# Patient Record
Sex: MALE | Race: WHITE | NOT HISPANIC OR LATINO | Employment: OTHER | URBAN - METROPOLITAN AREA
[De-identification: names, ages, dates, MRNs, and addresses within clinical notes are randomized per-mention and may not be internally consistent; named-entity substitution may affect disease eponyms.]

---

## 2017-01-18 ASSESSMENT — MIFFLIN-ST. JEOR: SCORE: 1744.63

## 2017-01-22 ENCOUNTER — ANESTHESIA - HEALTHEAST (OUTPATIENT)
Dept: SURGERY | Facility: CLINIC | Age: 72
End: 2017-01-22

## 2017-01-23 ENCOUNTER — SURGERY - HEALTHEAST (OUTPATIENT)
Dept: SURGERY | Facility: CLINIC | Age: 72
End: 2017-01-23

## 2017-01-23 ASSESSMENT — MIFFLIN-ST. JEOR: SCORE: 1749.16

## 2018-11-06 ENCOUNTER — RECORDS - HEALTHEAST (OUTPATIENT)
Dept: LAB | Facility: CLINIC | Age: 73
End: 2018-11-06

## 2018-11-06 LAB
CHOLEST SERPL-MCNC: 218 MG/DL
FASTING STATUS PATIENT QL REPORTED: NO
HDLC SERPL-MCNC: 48 MG/DL
LDLC SERPL CALC-MCNC: 141 MG/DL
PSA SERPL-MCNC: 4.3 NG/ML (ref 0–6.5)
TRIGL SERPL-MCNC: 146 MG/DL

## 2021-05-30 VITALS — WEIGHT: 213 LBS | BODY MASS INDEX: 28.85 KG/M2 | HEIGHT: 72 IN

## 2021-06-08 NOTE — ANESTHESIA PREPROCEDURE EVALUATION
Anesthesia Evaluation      Patient summary reviewed   No history of anesthetic complications     Airway   Mallampati: II  Neck ROM: full   Pulmonary - negative ROS and normal exam                          Cardiovascular - negative ROS and normal exam   Neuro/Psych - negative ROS     Endo/Other - negative ROS      GI/Hepatic/Renal - negative ROS           Dental - normal exam                        Anesthesia Plan  Planned anesthetic: MAC  Decadron, Zofran, Toradol.  Ketamine (0.5 mg/kg).    After discussing with Dr. Riley, we decided to proceed with sedation only, no GA.  ASA 1   Induction: intravenous   Anesthetic plan and risks discussed with: patient  Anesthesia plan special considerations: antiemetics,   Post-op plan: routine recovery

## 2021-06-08 NOTE — ANESTHESIA CARE TRANSFER NOTE
Last vitals:   Vitals:    01/23/17 0741   BP: (!) 88/51   Pulse: (!) 52   Resp: 16   Temp: 36.8  C (98.2  F)   SpO2: 98%     Patient's level of consciousness is drowsy  Spontaneous respirations: yes  Maintains airway independently: yes  Dentition unchanged: yes  Oropharynx: oropharynx clear of all foreign objects    QCDR Measures:  ASA# 20 - Surgical Safety Checklist: ASA20A - Safety Checks Done  PQRS# 430 - Adult PONV Prevention: 4558F - Pt received => 2 anti-emetic agents (different classes) preop & intraop  ASA# 8 - Peds PONV Prevention: NA - Not pediatric patient, not GA or 2 or more risk factors NOT present  PQRS# 424 - Megha-op Temp Management: 4559F - At least one body temp DOCUMENTED => 35.5C or 95.9F within required timeframe  PQRS# 426 - PACU Transfer Protocol: - Transfer of care checklist used  ASA# 14 - Acute Post-op Pain: ASA14B - Patient did NOT experience pain >= 7 out of 10    I completed my SBAR handoff to the receiving nurse per policy and procedure.  To Phase 2 rm 9. VSS. Report to BEVERLY Alfaro.

## 2021-06-08 NOTE — ANESTHESIA POSTPROCEDURE EVALUATION
Patient: Dilan Head  EXCISION BACK MASS  Anesthesia type: MAC    Patient location: Phase II Recovery  Last vitals:   Vitals:    01/23/17 0830   BP: 115/70   Pulse: 61   Resp: 16   Temp:    SpO2: 97%     Post vital signs: stable  Level of consciousness: awake and responds to simple questions  Post-anesthesia pain: pain controlled  Post-anesthesia nausea and vomiting: no  Pulmonary: unassisted, return to baseline  Cardiovascular: stable and blood pressure at baseline  Hydration: adequate  Anesthetic events: no    QCDR Measures:  ASA# 11 - Megha-op Cardiac Arrest: ASA11B - Patient did NOT experience unanticipated cardiac arrest  ASA# 12 - Megha-op Mortality Rate: ASA12B - Patient did NOT die  ASA# 13 - PACU Re-Intubation Rate: NA - No ETT / LMA used for case  ASA# 10 - Composite Anes Safety: ASA10A - No serious adverse event  ASA# 38 - New Corneal Injury: ASA38A - No new exposure keratitis or corneal abrasion in PACU    Additional Notes:

## 2021-11-17 ENCOUNTER — LAB REQUISITION (OUTPATIENT)
Dept: LAB | Facility: CLINIC | Age: 76
End: 2021-11-17
Payer: COMMERCIAL

## 2021-11-17 DIAGNOSIS — Z03.818 ENCOUNTER FOR OBSERVATION FOR SUSPECTED EXPOSURE TO OTHER BIOLOGICAL AGENTS RULED OUT: ICD-10-CM

## 2021-11-17 PROCEDURE — U0005 INFEC AGEN DETEC AMPLI PROBE: HCPCS | Mod: ORL | Performed by: PHYSICIAN ASSISTANT

## 2021-11-18 LAB — SARS-COV-2 RNA RESP QL NAA+PROBE: NEGATIVE

## 2023-06-28 ENCOUNTER — LAB REQUISITION (OUTPATIENT)
Dept: LAB | Facility: CLINIC | Age: 78
End: 2023-06-28

## 2023-06-28 DIAGNOSIS — E78.5 HYPERLIPIDEMIA, UNSPECIFIED: ICD-10-CM

## 2023-06-28 LAB
ALBUMIN SERPL BCG-MCNC: 4.1 G/DL (ref 3.5–5.2)
ALP SERPL-CCNC: 76 U/L (ref 40–129)
ALT SERPL W P-5'-P-CCNC: 27 U/L (ref 0–70)
ANION GAP SERPL CALCULATED.3IONS-SCNC: 11 MMOL/L (ref 7–15)
AST SERPL W P-5'-P-CCNC: 26 U/L (ref 0–45)
BILIRUB SERPL-MCNC: 0.5 MG/DL
BUN SERPL-MCNC: 17.4 MG/DL (ref 8–23)
CALCIUM SERPL-MCNC: 9.2 MG/DL (ref 8.8–10.2)
CHLORIDE SERPL-SCNC: 103 MMOL/L (ref 98–107)
CHOLEST SERPL-MCNC: 200 MG/DL
CREAT SERPL-MCNC: 1.01 MG/DL (ref 0.67–1.17)
DEPRECATED HCO3 PLAS-SCNC: 24 MMOL/L (ref 22–29)
GFR SERPL CREATININE-BSD FRML MDRD: 78 ML/MIN/1.73M2
GLUCOSE SERPL-MCNC: 84 MG/DL (ref 70–99)
HDLC SERPL-MCNC: 52 MG/DL
LDLC SERPL CALC-MCNC: 133 MG/DL
NONHDLC SERPL-MCNC: 148 MG/DL
POTASSIUM SERPL-SCNC: 3.8 MMOL/L (ref 3.4–5.3)
PROT SERPL-MCNC: 6.6 G/DL (ref 6.4–8.3)
SODIUM SERPL-SCNC: 138 MMOL/L (ref 136–145)
TRIGL SERPL-MCNC: 76 MG/DL

## 2023-06-28 PROCEDURE — 80061 LIPID PANEL: CPT | Performed by: PHYSICIAN ASSISTANT

## 2023-06-28 PROCEDURE — 80053 COMPREHEN METABOLIC PANEL: CPT | Performed by: PHYSICIAN ASSISTANT

## 2024-04-17 ENCOUNTER — APPOINTMENT (OUTPATIENT)
Dept: CT IMAGING | Facility: CLINIC | Age: 79
End: 2024-04-17
Attending: INTERNAL MEDICINE
Payer: COMMERCIAL

## 2024-04-17 ENCOUNTER — HOSPITAL ENCOUNTER (OUTPATIENT)
Facility: CLINIC | Age: 79
Setting detail: OBSERVATION
Discharge: HOME | End: 2024-04-18
Attending: STUDENT IN AN ORGANIZED HEALTH CARE EDUCATION/TRAINING PROGRAM | Admitting: STUDENT IN AN ORGANIZED HEALTH CARE EDUCATION/TRAINING PROGRAM
Payer: COMMERCIAL

## 2024-04-17 ENCOUNTER — APPOINTMENT (OUTPATIENT)
Dept: CARDIOLOGY | Facility: CLINIC | Age: 79
End: 2024-04-17
Attending: INTERNAL MEDICINE
Payer: COMMERCIAL

## 2024-04-17 ENCOUNTER — APPOINTMENT (OUTPATIENT)
Dept: CT IMAGING | Facility: CLINIC | Age: 79
End: 2024-04-17
Attending: STUDENT IN AN ORGANIZED HEALTH CARE EDUCATION/TRAINING PROGRAM
Payer: COMMERCIAL

## 2024-04-17 DIAGNOSIS — F51.01 PRIMARY INSOMNIA: ICD-10-CM

## 2024-04-17 DIAGNOSIS — R52 GENERALIZED PAIN: Primary | ICD-10-CM

## 2024-04-17 DIAGNOSIS — R07.9 CHEST PAIN, UNSPECIFIED TYPE: ICD-10-CM

## 2024-04-17 LAB
ALBUMIN SERPL BCG-MCNC: 3.8 G/DL (ref 3.5–5.2)
ALP SERPL-CCNC: 74 U/L (ref 40–150)
ALT SERPL W P-5'-P-CCNC: 17 U/L (ref 0–70)
ANION GAP SERPL CALCULATED.3IONS-SCNC: 10 MMOL/L (ref 7–15)
ANION GAP SERPL CALCULATED.3IONS-SCNC: 8 MMOL/L (ref 7–15)
AST SERPL W P-5'-P-CCNC: 25 U/L (ref 0–45)
ATRIAL RATE - MUSE: 69 BPM
BASOPHILS # BLD AUTO: 0 10E3/UL (ref 0–0.2)
BASOPHILS # BLD AUTO: 0.1 10E3/UL (ref 0–0.2)
BASOPHILS NFR BLD AUTO: 0 %
BASOPHILS NFR BLD AUTO: 1 %
BILIRUB SERPL-MCNC: 0.6 MG/DL
BSA FOR ECHO PROCEDURE: 2.34 M2
BUN SERPL-MCNC: 23.9 MG/DL (ref 8–23)
BUN SERPL-MCNC: 27.6 MG/DL (ref 8–23)
CALCIUM SERPL-MCNC: 9 MG/DL (ref 8.8–10.2)
CALCIUM SERPL-MCNC: 9.2 MG/DL (ref 8.8–10.2)
CCTA ASCENDING AORTA: 3.5
CCTA SINUS: 4
CHLORIDE SERPL-SCNC: 106 MMOL/L (ref 98–107)
CHLORIDE SERPL-SCNC: 109 MMOL/L (ref 98–107)
CREAT SERPL-MCNC: 0.99 MG/DL (ref 0.67–1.17)
CREAT SERPL-MCNC: 1.26 MG/DL (ref 0.67–1.17)
CRP SERPL-MCNC: 3.23 MG/L
CV CALCIUM SCORE AGATSTON LM: 0
CV CALCIUM SCORING AGATSON LAD: 0
CV CALCIUM SCORING AGATSTON CX: 0
CV CALCIUM SCORING AGATSTON RCA: 150
CV CALCIUM SCORING AGATSTON TOTAL: 150
DEPRECATED HCO3 PLAS-SCNC: 23 MMOL/L (ref 22–29)
DEPRECATED HCO3 PLAS-SCNC: 27 MMOL/L (ref 22–29)
DIASTOLIC BLOOD PRESSURE - MUSE: NORMAL MMHG
EGFRCR SERPLBLD CKD-EPI 2021: 59 ML/MIN/1.73M2
EGFRCR SERPLBLD CKD-EPI 2021: 79 ML/MIN/1.73M2
EOSINOPHIL # BLD AUTO: 0.1 10E3/UL (ref 0–0.7)
EOSINOPHIL # BLD AUTO: 0.3 10E3/UL (ref 0–0.7)
EOSINOPHIL NFR BLD AUTO: 1 %
EOSINOPHIL NFR BLD AUTO: 4 %
ERYTHROCYTE [DISTWIDTH] IN BLOOD BY AUTOMATED COUNT: 12.7 % (ref 10–15)
ERYTHROCYTE [DISTWIDTH] IN BLOOD BY AUTOMATED COUNT: 12.8 % (ref 10–15)
ERYTHROCYTE [SEDIMENTATION RATE] IN BLOOD BY WESTERGREN METHOD: 7 MM/HR (ref 0–20)
FLUAV RNA SPEC QL NAA+PROBE: NEGATIVE
FLUBV RNA RESP QL NAA+PROBE: NEGATIVE
GLUCOSE SERPL-MCNC: 94 MG/DL (ref 70–99)
GLUCOSE SERPL-MCNC: 98 MG/DL (ref 70–99)
HCT VFR BLD AUTO: 44.2 % (ref 40–53)
HCT VFR BLD AUTO: 48.3 % (ref 40–53)
HGB BLD-MCNC: 15.1 G/DL (ref 13.3–17.7)
HGB BLD-MCNC: 16.2 G/DL (ref 13.3–17.7)
HOLD SPECIMEN: NORMAL
IMM GRANULOCYTES # BLD: 0 10E3/UL
IMM GRANULOCYTES # BLD: 0.1 10E3/UL
IMM GRANULOCYTES NFR BLD: 0 %
IMM GRANULOCYTES NFR BLD: 0 %
INTERPRETATION ECG - MUSE: NORMAL
LYMPHOCYTES # BLD AUTO: 0.7 10E3/UL (ref 0.8–5.3)
LYMPHOCYTES # BLD AUTO: 1.6 10E3/UL (ref 0.8–5.3)
LYMPHOCYTES NFR BLD AUTO: 21 %
LYMPHOCYTES NFR BLD AUTO: 6 %
MAGNESIUM SERPL-MCNC: 2.2 MG/DL (ref 1.7–2.3)
MCH RBC QN AUTO: 29.6 PG (ref 26.5–33)
MCH RBC QN AUTO: 30 PG (ref 26.5–33)
MCHC RBC AUTO-ENTMCNC: 33.5 G/DL (ref 31.5–36.5)
MCHC RBC AUTO-ENTMCNC: 34.2 G/DL (ref 31.5–36.5)
MCV RBC AUTO: 88 FL (ref 78–100)
MCV RBC AUTO: 88 FL (ref 78–100)
MONOCYTES # BLD AUTO: 0.6 10E3/UL (ref 0–1.3)
MONOCYTES # BLD AUTO: 0.8 10E3/UL (ref 0–1.3)
MONOCYTES NFR BLD AUTO: 6 %
MONOCYTES NFR BLD AUTO: 8 %
NEUTROPHILS # BLD AUTO: 10.7 10E3/UL (ref 1.6–8.3)
NEUTROPHILS # BLD AUTO: 5 10E3/UL (ref 1.6–8.3)
NEUTROPHILS NFR BLD AUTO: 65 %
NEUTROPHILS NFR BLD AUTO: 87 %
NRBC # BLD AUTO: 0 10E3/UL
NRBC # BLD AUTO: 0 10E3/UL
NRBC BLD AUTO-RTO: 0 /100
NRBC BLD AUTO-RTO: 0 /100
NT-PROBNP SERPL-MCNC: 45 PG/ML (ref 0–1800)
P AXIS - MUSE: 55 DEGREES
PLATELET # BLD AUTO: 199 10E3/UL (ref 150–450)
PLATELET # BLD AUTO: 240 10E3/UL (ref 150–450)
POTASSIUM SERPL-SCNC: 3.9 MMOL/L (ref 3.4–5.3)
POTASSIUM SERPL-SCNC: 4.1 MMOL/L (ref 3.4–5.3)
PR INTERVAL - MUSE: 188 MS
PROCALCITONIN SERPL IA-MCNC: 0.05 NG/ML
PROT SERPL-MCNC: 6.1 G/DL (ref 6.4–8.3)
QRS DURATION - MUSE: 98 MS
QT - MUSE: 416 MS
QTC - MUSE: 445 MS
R AXIS - MUSE: -61 DEGREES
RBC # BLD AUTO: 5.04 10E6/UL (ref 4.4–5.9)
RBC # BLD AUTO: 5.47 10E6/UL (ref 4.4–5.9)
RSV RNA SPEC NAA+PROBE: NEGATIVE
SARS-COV-2 RNA RESP QL NAA+PROBE: NEGATIVE
SODIUM SERPL-SCNC: 140 MMOL/L (ref 135–145)
SODIUM SERPL-SCNC: 143 MMOL/L (ref 135–145)
SYSTOLIC BLOOD PRESSURE - MUSE: NORMAL MMHG
T AXIS - MUSE: 37 DEGREES
TROPONIN T SERPL HS-MCNC: 6 NG/L
TROPONIN T SERPL HS-MCNC: 7 NG/L
TROPONIN T SERPL HS-MCNC: 7 NG/L
VENTRICULAR RATE- MUSE: 69 BPM
WBC # BLD AUTO: 12.4 10E3/UL (ref 4–11)
WBC # BLD AUTO: 7.7 10E3/UL (ref 4–11)

## 2024-04-17 PROCEDURE — 74177 CT ABD & PELVIS W/CONTRAST: CPT

## 2024-04-17 PROCEDURE — 99207 PR APP CREDIT; MD BILLING SHARED VISIT: CPT | Performed by: FAMILY MEDICINE

## 2024-04-17 PROCEDURE — 250N000013 HC RX MED GY IP 250 OP 250 PS 637: Performed by: INTERNAL MEDICINE

## 2024-04-17 PROCEDURE — 99223 1ST HOSP IP/OBS HIGH 75: CPT | Performed by: INTERNAL MEDICINE

## 2024-04-17 PROCEDURE — 250N000011 HC RX IP 250 OP 636: Performed by: FAMILY MEDICINE

## 2024-04-17 PROCEDURE — G0378 HOSPITAL OBSERVATION PER HR: HCPCS

## 2024-04-17 PROCEDURE — 96374 THER/PROPH/DIAG INJ IV PUSH: CPT

## 2024-04-17 PROCEDURE — 36415 COLL VENOUS BLD VENIPUNCTURE: CPT | Performed by: INTERNAL MEDICINE

## 2024-04-17 PROCEDURE — 96375 TX/PRO/DX INJ NEW DRUG ADDON: CPT | Mod: 59

## 2024-04-17 PROCEDURE — 250N000013 HC RX MED GY IP 250 OP 250 PS 637: Performed by: FAMILY MEDICINE

## 2024-04-17 PROCEDURE — 85025 COMPLETE CBC W/AUTO DIFF WBC: CPT | Performed by: STUDENT IN AN ORGANIZED HEALTH CARE EDUCATION/TRAINING PROGRAM

## 2024-04-17 PROCEDURE — 250N000009 HC RX 250: Performed by: INTERNAL MEDICINE

## 2024-04-17 PROCEDURE — 85652 RBC SED RATE AUTOMATED: CPT | Performed by: INTERNAL MEDICINE

## 2024-04-17 PROCEDURE — 75574 CT ANGIO HRT W/3D IMAGE: CPT | Mod: 26 | Performed by: GENERAL ACUTE CARE HOSPITAL

## 2024-04-17 PROCEDURE — 93306 TTE W/DOPPLER COMPLETE: CPT | Mod: 26 | Performed by: INTERNAL MEDICINE

## 2024-04-17 PROCEDURE — 86140 C-REACTIVE PROTEIN: CPT | Performed by: INTERNAL MEDICINE

## 2024-04-17 PROCEDURE — 82040 ASSAY OF SERUM ALBUMIN: CPT | Performed by: INTERNAL MEDICINE

## 2024-04-17 PROCEDURE — 36415 COLL VENOUS BLD VENIPUNCTURE: CPT | Performed by: EMERGENCY MEDICINE

## 2024-04-17 PROCEDURE — 85025 COMPLETE CBC W/AUTO DIFF WBC: CPT | Mod: 91 | Performed by: INTERNAL MEDICINE

## 2024-04-17 PROCEDURE — 84484 ASSAY OF TROPONIN QUANT: CPT | Performed by: INTERNAL MEDICINE

## 2024-04-17 PROCEDURE — 84145 PROCALCITONIN (PCT): CPT | Performed by: INTERNAL MEDICINE

## 2024-04-17 PROCEDURE — 71275 CT ANGIOGRAPHY CHEST: CPT

## 2024-04-17 PROCEDURE — 93005 ELECTROCARDIOGRAM TRACING: CPT | Mod: XU | Performed by: EMERGENCY MEDICINE

## 2024-04-17 PROCEDURE — 84450 TRANSFERASE (AST) (SGOT): CPT | Performed by: INTERNAL MEDICINE

## 2024-04-17 PROCEDURE — 96361 HYDRATE IV INFUSION ADD-ON: CPT

## 2024-04-17 PROCEDURE — 84484 ASSAY OF TROPONIN QUANT: CPT | Mod: 91 | Performed by: STUDENT IN AN ORGANIZED HEALTH CARE EDUCATION/TRAINING PROGRAM

## 2024-04-17 PROCEDURE — 250N000011 HC RX IP 250 OP 636: Performed by: STUDENT IN AN ORGANIZED HEALTH CARE EDUCATION/TRAINING PROGRAM

## 2024-04-17 PROCEDURE — 83735 ASSAY OF MAGNESIUM: CPT | Performed by: INTERNAL MEDICINE

## 2024-04-17 PROCEDURE — 250N000013 HC RX MED GY IP 250 OP 250 PS 637: Performed by: STUDENT IN AN ORGANIZED HEALTH CARE EDUCATION/TRAINING PROGRAM

## 2024-04-17 PROCEDURE — 87637 SARSCOV2&INF A&B&RSV AMP PRB: CPT | Performed by: INTERNAL MEDICINE

## 2024-04-17 PROCEDURE — 36415 COLL VENOUS BLD VENIPUNCTURE: CPT | Performed by: STUDENT IN AN ORGANIZED HEALTH CARE EDUCATION/TRAINING PROGRAM

## 2024-04-17 PROCEDURE — 80048 BASIC METABOLIC PNL TOTAL CA: CPT | Performed by: STUDENT IN AN ORGANIZED HEALTH CARE EDUCATION/TRAINING PROGRAM

## 2024-04-17 PROCEDURE — 75574 CT ANGIO HRT W/3D IMAGE: CPT

## 2024-04-17 PROCEDURE — 255N000002 HC RX 255 OP 636: Performed by: FAMILY MEDICINE

## 2024-04-17 PROCEDURE — 258N000003 HC RX IP 258 OP 636: Performed by: INTERNAL MEDICINE

## 2024-04-17 PROCEDURE — 83880 ASSAY OF NATRIURETIC PEPTIDE: CPT | Performed by: INTERNAL MEDICINE

## 2024-04-17 PROCEDURE — 99204 OFFICE O/P NEW MOD 45 MIN: CPT | Performed by: INTERNAL MEDICINE

## 2024-04-17 PROCEDURE — 99285 EMERGENCY DEPT VISIT HI MDM: CPT | Mod: 25

## 2024-04-17 PROCEDURE — C9113 INJ PANTOPRAZOLE SODIUM, VIA: HCPCS | Performed by: FAMILY MEDICINE

## 2024-04-17 RX ORDER — AMOXICILLIN 250 MG
2 CAPSULE ORAL 2 TIMES DAILY PRN
Status: DISCONTINUED | OUTPATIENT
Start: 2024-04-17 | End: 2024-04-18 | Stop reason: HOSPADM

## 2024-04-17 RX ORDER — BENZOCAINE/MENTHOL 6 MG-10 MG
1 LOZENGE MUCOUS MEMBRANE PRN
COMMUNITY
Start: 2023-06-28

## 2024-04-17 RX ORDER — CALCIUM CARBONATE 500 MG/1
1000 TABLET, CHEWABLE ORAL 4 TIMES DAILY PRN
Status: DISCONTINUED | OUTPATIENT
Start: 2024-04-17 | End: 2024-04-18 | Stop reason: HOSPADM

## 2024-04-17 RX ORDER — LIDOCAINE 40 MG/G
CREAM TOPICAL
Status: DISCONTINUED | OUTPATIENT
Start: 2024-04-17 | End: 2024-04-18 | Stop reason: HOSPADM

## 2024-04-17 RX ORDER — MAGNESIUM HYDROXIDE/ALUMINUM HYDROXICE/SIMETHICONE 120; 1200; 1200 MG/30ML; MG/30ML; MG/30ML
15 SUSPENSION ORAL ONCE
Status: COMPLETED | OUTPATIENT
Start: 2024-04-17 | End: 2024-04-17

## 2024-04-17 RX ORDER — ALBUTEROL SULFATE 90 UG/1
1-2 AEROSOL, METERED RESPIRATORY (INHALATION) 4 TIMES DAILY PRN
COMMUNITY
Start: 2022-09-01

## 2024-04-17 RX ORDER — IOPAMIDOL 755 MG/ML
100 INJECTION, SOLUTION INTRAVASCULAR ONCE
Status: COMPLETED | OUTPATIENT
Start: 2024-04-17 | End: 2024-04-17

## 2024-04-17 RX ORDER — OXYCODONE HYDROCHLORIDE 5 MG/1
10 TABLET ORAL EVERY 4 HOURS PRN
Status: DISCONTINUED | OUTPATIENT
Start: 2024-04-17 | End: 2024-04-18 | Stop reason: HOSPADM

## 2024-04-17 RX ORDER — NITROGLYCERIN 0.4 MG/1
0.4 TABLET SUBLINGUAL ONCE
Status: COMPLETED | OUTPATIENT
Start: 2024-04-17 | End: 2024-04-17

## 2024-04-17 RX ORDER — OXYCODONE HYDROCHLORIDE 5 MG/1
5 TABLET ORAL EVERY 4 HOURS PRN
Status: DISCONTINUED | OUTPATIENT
Start: 2024-04-17 | End: 2024-04-18 | Stop reason: HOSPADM

## 2024-04-17 RX ORDER — ASPIRIN 325 MG
325 TABLET ORAL ONCE
Status: COMPLETED | OUTPATIENT
Start: 2024-04-17 | End: 2024-04-17

## 2024-04-17 RX ORDER — IOPAMIDOL 755 MG/ML
90 INJECTION, SOLUTION INTRAVASCULAR ONCE
Status: COMPLETED | OUTPATIENT
Start: 2024-04-17 | End: 2024-04-17

## 2024-04-17 RX ORDER — SODIUM CHLORIDE 9 MG/ML
INJECTION, SOLUTION INTRAVENOUS CONTINUOUS
Status: DISCONTINUED | OUTPATIENT
Start: 2024-04-17 | End: 2024-04-17

## 2024-04-17 RX ORDER — METOPROLOL TARTRATE 1 MG/ML
5 INJECTION, SOLUTION INTRAVENOUS
Status: DISCONTINUED | OUTPATIENT
Start: 2024-04-17 | End: 2024-04-18 | Stop reason: HOSPADM

## 2024-04-17 RX ORDER — ACETAMINOPHEN 325 MG/1
650 TABLET ORAL EVERY 4 HOURS PRN
Status: DISCONTINUED | OUTPATIENT
Start: 2024-04-17 | End: 2024-04-18 | Stop reason: HOSPADM

## 2024-04-17 RX ORDER — AMOXICILLIN 250 MG
1 CAPSULE ORAL 2 TIMES DAILY PRN
Status: DISCONTINUED | OUTPATIENT
Start: 2024-04-17 | End: 2024-04-18 | Stop reason: HOSPADM

## 2024-04-17 RX ORDER — ACETAMINOPHEN 650 MG/1
650 SUPPOSITORY RECTAL EVERY 4 HOURS PRN
Status: DISCONTINUED | OUTPATIENT
Start: 2024-04-17 | End: 2024-04-18 | Stop reason: HOSPADM

## 2024-04-17 RX ORDER — NITROGLYCERIN 0.4 MG/1
0.4 TABLET SUBLINGUAL EVERY 5 MIN PRN
Status: DISCONTINUED | OUTPATIENT
Start: 2024-04-17 | End: 2024-04-18

## 2024-04-17 RX ADMIN — IOPAMIDOL 100 ML: 755 INJECTION, SOLUTION INTRAVENOUS at 09:00

## 2024-04-17 RX ADMIN — NITROGLYCERIN 0.4 MG: 0.4 TABLET SUBLINGUAL at 09:38

## 2024-04-17 RX ADMIN — ASPIRIN 325 MG ORAL TABLET 325 MG: 325 PILL ORAL at 02:46

## 2024-04-17 RX ADMIN — ACETAMINOPHEN 650 MG: 325 TABLET ORAL at 15:48

## 2024-04-17 RX ADMIN — ALUMINUM HYDROXIDE, MAGNESIUM HYDROXIDE, AND DIMETHICONE 15 ML: 200; 20; 200 SUSPENSION ORAL at 05:00

## 2024-04-17 RX ADMIN — IOPAMIDOL 90 ML: 755 INJECTION, SOLUTION INTRAVENOUS at 03:28

## 2024-04-17 RX ADMIN — ACETAMINOPHEN 650 MG: 325 TABLET ORAL at 21:45

## 2024-04-17 RX ADMIN — FAMOTIDINE 20 MG: 10 INJECTION, SOLUTION INTRAVENOUS at 05:00

## 2024-04-17 RX ADMIN — NITROGLYCERIN 0.4 MG: 0.4 TABLET SUBLINGUAL at 05:44

## 2024-04-17 RX ADMIN — SODIUM CHLORIDE: 9 INJECTION, SOLUTION INTRAVENOUS at 06:38

## 2024-04-17 RX ADMIN — PANTOPRAZOLE SODIUM 40 MG: 40 INJECTION, POWDER, FOR SOLUTION INTRAVENOUS at 17:33

## 2024-04-17 RX ADMIN — OXYCODONE HYDROCHLORIDE 5 MG: 5 TABLET ORAL at 20:19

## 2024-04-17 RX ADMIN — PERFLUTREN 1.5 ML: 6.52 INJECTION, SUSPENSION INTRAVENOUS at 11:20

## 2024-04-17 RX ADMIN — NITROGLYCERIN 0.4 MG: 0.4 TABLET SUBLINGUAL at 05:49

## 2024-04-17 RX ADMIN — METOPROLOL TARTRATE 5 MG: 1 INJECTION, SOLUTION INTRAVENOUS at 09:41

## 2024-04-17 ASSESSMENT — COLUMBIA-SUICIDE SEVERITY RATING SCALE - C-SSRS
2. HAVE YOU ACTUALLY HAD ANY THOUGHTS OF KILLING YOURSELF IN THE PAST MONTH?: NO
6. HAVE YOU EVER DONE ANYTHING, STARTED TO DO ANYTHING, OR PREPARED TO DO ANYTHING TO END YOUR LIFE?: NO
1. IN THE PAST MONTH, HAVE YOU WISHED YOU WERE DEAD OR WISHED YOU COULD GO TO SLEEP AND NOT WAKE UP?: NO

## 2024-04-17 ASSESSMENT — ACTIVITIES OF DAILY LIVING (ADL)
ADLS_ACUITY_SCORE: 24
ADLS_ACUITY_SCORE: 35
ADLS_ACUITY_SCORE: 35
ADLS_ACUITY_SCORE: 24
ADLS_ACUITY_SCORE: 24
ADLS_ACUITY_SCORE: 35
ADLS_ACUITY_SCORE: 24
ADLS_ACUITY_SCORE: 35
ADLS_ACUITY_SCORE: 35
DEPENDENT_IADLS:: INDEPENDENT
ADLS_ACUITY_SCORE: 35
ADLS_ACUITY_SCORE: 24
ADLS_ACUITY_SCORE: 35
ADLS_ACUITY_SCORE: 24
ADLS_ACUITY_SCORE: 35
ADLS_ACUITY_SCORE: 35
ADLS_ACUITY_SCORE: 24
ADLS_ACUITY_SCORE: 35
ADLS_ACUITY_SCORE: 35
ADLS_ACUITY_SCORE: 24

## 2024-04-17 NOTE — ED TRIAGE NOTES
Pt presents to ED with c/o chest pain and shortness of breath starting around 0000.  Denies chest pain at present.  Pt reports still having shortness of breath, feeling like he can't take a deep breath in.  Notes a cough that has been present x3 months.  Pt went to roll over in bed this morning when sx started.     Triage Assessment (Adult)       Row Name 04/17/24 0221          Triage Assessment    Airway WDL WDL        Respiratory WDL    Respiratory WDL X;rhythm/pattern;cough     Rhythm/Pattern, Respiratory shortness of breath     Cough Frequency infrequent  reports cough for the past few months, no changes        Skin Circulation/Temperature WDL    Skin Circulation/Temperature WDL WDL        Cardiac WDL    Cardiac WDL X;chest pain     Cardiac Rhythm NSR        Chest Pain Assessment    Chest Pain Location anterior chest, left     Chest Pain Radiation arm  left     Duration onset at 0000, denies pain now, pain 3/10 when present     Precipitating Factors at rest     Associated Signs/Symptoms dyspnea     Chest Pain Intervention cardiac biomarkers drawn;12-lead ECG obtained;cardiac monitoring continued        Peripheral/Neurovascular WDL    Peripheral Neurovascular WDL WDL        Cognitive/Neuro/Behavioral WDL    Cognitive/Neuro/Behavioral WDL WDL

## 2024-04-17 NOTE — PLAN OF CARE
Pt not complaining of chest pain or SOB during noon assessment. Pt had echo, CT done. MD put in order for biopsy, time still unknown. NS at 100ml/hr. Still NPO.   Ofelia Montanez RN on 4/17/2024 at 12:52 PM   Problem: Adult Inpatient Plan of Care  Goal: Absence of Hospital-Acquired Illness or Injury  Intervention: Identify and Manage Fall Risk  Recent Flowsheet Documentation  Taken 4/17/2024 0800 by Ofelia Montanez, RN  Safety Promotion/Fall Prevention:   activity supervised   safety round/check completed   increase visualization of patient   increased rounding and observation   supervised activity     Problem: Skin Injury Risk Increased  Goal: Skin Health and Integrity  Intervention: Plan: Nurse Driven Intervention: Moisture Management  Recent Flowsheet Documentation  Taken 4/17/2024 0800 by Ofelia Montanez, RN  Moisture Interventions:   Encourage regular toileting   No brief in bed     Problem: Chest Pain  Goal: Resolution of Chest Pain Symptoms  4/17/2024 1250 by Ofelia Montanez, RN  Outcome: Progressing  4/17/2024 0914 by Ofelia Montanez, RN  Outcome: Progressing   Goal Outcome Evaluation:

## 2024-04-17 NOTE — PLAN OF CARE
Plan for pt to have biopsy tomorrow. MD made pt regular diet then at midnight NPO for procedure time still unknown.   Ofelia Montanez RN on 4/17/2024 at 2:10 PM

## 2024-04-17 NOTE — CONSULTS
Care Management Initial Consult    General Information  Assessment completed with: Patient, Spouse or significant other, Patient and wife Juanita at bedside  Type of CM/SW Visit: Initial Assessment    Primary Care Provider verified and updated as needed: Yes   Readmission within the last 30 days: no previous admission in last 30 days      Reason for Consult: discharge planning  Advance Care Planning: Advance Care Planning Reviewed: no concerns identified, questions answered, other (see comments) (Given Honoring Choices)          Communication Assessment  Patient's communication style: spoken language (English or Bilingual)    Hearing Difficulty or Deaf: yes   Wear Glasses or Blind: yes    Cognitive  Cognitive/Neuro/Behavioral: WDL                      Living Environment:   People in home: spouse     Current living Arrangements: house      Able to return to prior arrangements: yes  Living Arrangement Comments: Lives with wife    Family/Social Support:  Care provided by: self  Provides care for: no one  Marital Status:   Wife  Juanita       Description of Support System: Supportive, Involved    Support Assessment: Adequate family and caregiver support    Current Resources:   Patient receiving home care services: No     Community Resources: None  Equipment currently used at home: none  Supplies currently used at home: None    Employment/Financial:  Employment Status: retired        Financial Concerns: none   Referral to Financial Worker: No     oes the patient's insurance plan have a 3 day qualifying hospital stay waiver?  No    Lifestyle & Psychosocial Needs:  Social Determinants of Health     Food Insecurity: Not on file   Depression: Not on file   Housing Stability: Not on file   Tobacco Use: Medium Risk (7/1/2021)    Patient History     Smoking Tobacco Use: Former     Smokeless Tobacco Use: Unknown     Passive Exposure: Not on file   Financial Resource Strain: Not on file   Alcohol Use: Not on file  "  Transportation Needs: Not on file   Physical Activity: Not on file   Interpersonal Safety: Not on file   Stress: Not on file   Social Connections: Not on file   Health Literacy: Not on file       Functional Status:  Prior to admission patient needed assistance:   Dependent ADLs:: Independent  Dependent IADLs:: Independent  Assesssment of Functional Status: At functional baseline    Mental Health Status:  Mental Health Status: No Current Concerns       Chemical Dependency Status:              Values/Beliefs:  Spiritual, Cultural Beliefs, Baptism Practices, Values that affect care:                 Additional Information:   78-year-old male who is previously healthy and presents the emergency department with sudden onset left-sided chest pain \"pressure\" that radiates to his left arm with associated shortness of breath.  He is not in acute distress, has normal vital signs with the exception of hypertension.  He recently traveled by flight from [place].  No leg swelling .    Met with patient and wife Juanita to introduce CM services. Reports he lives with wife in a house. States independence with I/ADLs. No assistive ambulatory device; no home care services; does drive. Explained STUART/Observation Status to patient and wife. Answered questions to their satisfaction and provided them with Honoring Choices forms. Plan is for patient to return home with wife transporting patient on discharge.    BISHOP MORELOS RN/CM      "

## 2024-04-17 NOTE — PROGRESS NOTES
[facility]    Progress Note - Hospitalist Service       Date of Admission:  4/17/2024    Assessment & Plan   Dilan Head is a 78 year old male admitted on 4/17/2024. He has no significant PMH and is admitted for chest pain ACS rule out.     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 Adenopathy and/or thymoma not excluded. CT ap with mildly inflamed small elongated right inguinal node, likely accounting for palpable abnormality, tender to palpation on exam. No suspicious abdominopelvic adenopathy. Small fat-containing right inguinal hernia.     - Oncology referral placed on admission, discussed recommendation for mediastinal biopsy with IR   - IR consult placed, appreciate assistance     Left sided chest pain, very pleuritic and positional in nature  ACS ruled out  CT with no PE but has Moderate coronary artery calcifications. Borderline aneurysmal ascending thoracic aorta. Troponin negative x 2. BNP neg, normal electrolytes. EKG NSR no ischemic changes. Heart score: low risk.   Recent URI, pain is pleuritic and positional, with small effusion, consider pericarditis.   Last lipid profile 9 months ago: total cholesterol 200, . No A1c on chart review. BG well controlled during admission.   - Cardiology consulted on admission, appreciate cares and recs   - Echocardiogram and CT coronary artery angiogram ordered  - Telemetry    - ESR, CRP     Elevated creatinine, improved  BPH as seen on CT - Denies BPH symptoms   Cr improved with fluids.   - Encourage PO  - advised to follow up outpatient regarding BPH     Cough  CT chest: Scattered emphysematous changes. Remote smoking history   COVID test negative, BNP negative, vitally stable. Consider component of URI vs postviral cough given his URI symptoms about one month ago that have otherwise resolved, as well as emphysema, seems  less likely cardiac related given negative workup  - Monitor  - Lozenges PRN        Diet: NPO for Medical/Clinical Reasons Except for: Meds, Ice Chips    DVT Prophylaxis: Pneumatic Compression Devices  Turner Catheter: Not present  Fluids: mIVF while NPO  Lines: None     Cardiac Monitoring: ACTIVE order. Indication: Chest pain/ ACS rule out (24 hours)  Code Status: Full Code      Clinically Significant Risk Factors Present on Admission                       # Obesity: Estimated body mass index is 33.91 kg/m  as calculated from the following:    Height as of this encounter: 1.829 m (6').    Weight as of this encounter: 113.4 kg (250 lb).              Disposition Plan      Expected Discharge Date: 04/18/2024                The patient's care was discussed with the Attending Physician, Dr. Head .    Craol Gabriel DO PGY2  Hospitalist Service  [facility]  Securely message with [employer] (more info)  Text page via [facility] Paging/Directory     Patient independently seen and examined  Discussed with resident  Agree with documentation, assessment and plan as outlined above and below     Ignacio Head MD  [facility] Medicine Service  ______________________________________________________________________    Interval History   Nursing notes and vitals reviewed. No changes to symptoms after nitroglycerin x2. Vitally stable, BP this morning softer than on admissio, 112/61.     Patient reports ongoing chest pressure, only change from admission is that it does not radiate to his left shoulder anymore, which he believes was relieved with the nitroglycerin he received in the ED. Chest pressure is substernal, pleuritic, improved with sitting up or standing. Feels a bit short of breath all the time, no changes to symptoms with activity. Denies PND, orthopnea, dyspnea on exertion.     Beginning of March had viral URI type symptoms (congestion, cough), which resolved on their own. Developed  cardiac symptoms several weeks after his symptoms resolved.     He does snore, had a sleep study and was diagnosed with sleep apnea. Has a CPAP, but could not tolerate so does not wear it.     Denies family or personal history of cardiac events. (Mother in law has history of MI).     Former smoker.     Physical Exam   Vital Signs: Temp: 98.2  F (36.8  C) Temp src: Oral BP: 112/61 Pulse: 70   Resp: 26 SpO2: 95 % O2 Device: None (Room air)    Weight: 250 lbs 0 oz    Constitutional: awake, alert, cooperative, no apparent distress, and appears stated age  Eyes: Lids and lashes normal, pupils equal, round and reactive to light, extra ocular muscles intact, sclera clear, conjunctiva normal  ENT: normocephalic, without obvious abnormality, atraumatic, moist mucous membranes, external ears normal  Hematologic / Lymphatic: Tender right inguinal lymph node  Respiratory: No increased work of breathing, good air exchange, clear to auscultation bilaterally, no crackles or wheezing  Cardiovascular:  regular rate and rhythm, normal S1 and S2, no S3 or S4, and no murmur noted  GI: normal bowel sounds, soft, non-distended, non-tender  Skin: no bruising or bleeding, normal skin color, texture, turgor, and no redness, warmth, or swelling  Musculoskeletal: There is no redness, warmth, or swelling of the joints.  Full range of motion noted.  Motor strength is 5 out of 5 all extremities bilaterally.  Tone is normal.  Neuropsychiatric: General: normal, calm, and normal eye contact  Level of consciousness: alert / normal  Affect: normal  Orientation: oriented to self, place, time and situation    Medical Decision Making     Please see A&P for additional details of medical decision making.      Data     I have personally reviewed the following data over the past 24 hrs:    12.4 (H)  \   15.1   / 199     140 109 (H) 23.9 (H) /  98   3.9 23 0.99 \     ALT: 17 AST: 25 AP: 74 TBILI: 0.6   ALB: 3.8 TOT PROTEIN: 6.1 (L) LIPASE: N/A     Trop: 7  BNP: 45     Procal: 0.05 CRP: N/A Lactic Acid: N/A         Imaging results reviewed over the past 24 hrs:   Recent Results (from the past 24 hour(s))   CT Chest Pulmonary Embolism w Contrast    Narrative    EXAM: CT CHEST PULMONARY EMBOLISM W CONTRAST  LOCATION: [facility]  DATE: 4/17/2024    INDICATION: chest pain, SOB, recent airplane travel  COMPARISON: None.  TECHNIQUE: CT chest pulmonary angiogram during arterial phase injection of IV contrast. Multiplanar reformats and MIP reconstructions were performed. Dose reduction techniques were used.   CONTRAST: [id number] 90ml    FINDINGS:  ANGIOGRAM CHEST: Pulmonary arteries are normal caliber and negative for pulmonary emboli. Thoracic aorta is not well opacified and is  indeterminate for dissection. No CT evidence of right heart strain.    LUNGS AND PLEURA: Linear and dependent atelectasis in the posterior lung bases, greater on the right. No pleural effusions. Scattered emphysematous changes.    MEDIASTINUM/AXILLAE: 4.7 x 1.3 x 5.5 cm focus in the anterior mediastinum. Adenopathy and/or thymoma not excluded. Otherwise, No lymphadenopathy. Normal esophagus. Tiny anterior pericardial effusion.    CORONARY ARTERY CALCIFICATION: Mild.    UPPER ABDOMEN: Normal.    MUSCULOSKELETAL: No concerning osseous abnormalities.      Impression    IMPRESSION:  1.  No acute pulmonary embolus.  2.  Linear and dependent atelectasis in the posterior lung bases, greater on the right. Scattered emphysematous changes.  3.  4.7 x 1.3 x 5.5 cm focus in the anterior mediastinum. Adenopathy and/or thymoma not excluded.  4.  Tiny anterior pericardial effusion.     CT Abdomen Pelvis w Contrast    Narrative    EXAM: CT ABDOMEN AND PELVIS WITH CONTRAST  LOCATION: [facility]  DATE: 04/17/2024    INDICATION: Right lower quadrant inguinal mass. Hernia or lymph node?  COMPARISON: None.  TECHNIQUE: CT scan of the abdomen and pelvis was performed  following injection of IV contrast. Multiplanar reformats were obtained. Dose reduction techniques were used.  CONTRAST: 75 mL [id number] IV.    FINDINGS:   LOWER CHEST: Borderline aneurysmal ascending thoracic segment measuring 4.1 x 4.0 cm (image 1, series 4), partially visualized. Normal cardiac size. Mild coronary artery calcifications. No pericardial effusion. A few strands of platelike atelectasis in   the lower lungs. No pleural effusion on either side. Small hiatal hernia.    HEPATOBILIARY: Small left hepatic lobe cyst superiorly adjacent to the vena cava (image 56, series 4), no specific follow-up required. Mild diffuse fatty infiltration of the liver. Smooth hepatic contour. Patent hepatic and portal veins. No calcified   gallstones or biliary dilatation.    PANCREAS: Normal.    SPLEEN: Normal.    ADRENAL GLANDS: Normal.    KIDNEYS/BLADDER: Cortical simple cysts in each kidney, dominant cyst on the right anteriorly measuring 7.8 x 8.1 cm (image 114, series 4), no specific follow-up required. Both kidneys are well perfused without hydronephrosis or hydroureter. Partially   distended urinary bladder. Heterogeneous prostatomegaly bulging into the bladder base.    BOWEL: Formed stool material within normal caliber redundant colon, without mechanical obstruction or free gas. Scattered colonic diverticulosis, more apparent distally, without acute inflammation or secondary complications.    LYMPH NODES: Mildly inflamed small elongated lymph node in the right inguinal region measuring 1.4 x 1.0 cm (image 222, series 4, image 31, series 5, image 103, series 6), likely accounting for palpable abnormality. No suspicious abdominopelvic   adenopathy.    VASCULATURE: Mildly atherosclerotic normal caliber distal abdominal aorta measuring 1.9 x 2.0 cm (image 130, series 4). Normal caliber IVC.    PELVIC ORGANS: Heterogeneous prostatomegaly bulging into the bladder base. Phleboliths. Atherosclerotic changes. Stool material  within the rectosigmoid. Distal colonic diverticulosis without acute inflammation or secondary complications. Small   fat-containing right inguinal hernia.    MUSCULOSKELETAL: Mild degenerative changes involving the spine and joints of the pelvis.      Impression    IMPRESSION:   1.  Mildly inflamed small elongated right inguinal node, likely accounting for palpable abnormality. No suspicious abdominopelvic adenopathy. Small fat-containing right inguinal hernia.    2.  Scattered colonic diverticulosis, more apparent distally, without acute inflammation or secondary complications. Formed stool material within normal caliber redundant colon without mechanical obstruction or free gas.    3.  Heterogeneous prostatomegaly bulging into the bladder base. Cortical simple cysts in the kidneys, dominant cyst on the right anteriorly, no specific follow-up required.     4.  Normal cardiac size. Moderate coronary artery calcifications. Borderline aneurysmal ascending thoracic aorta. Atherosclerotic normal caliber distal abdominal aorta.

## 2024-04-17 NOTE — PHARMACY-ADMISSION MEDICATION HISTORY
Pharmacist Admission Medication History    Admission medication history is complete. The information provided in this note is only as accurate as the sources available at the time of the update.    Information Source(s): Patient and CareEverywhere/SureScripts via in-person    Pertinent Information:     Changes made to PTA medication list:  Added: None  Deleted: ASA  Changed: None    Allergies reviewed with patient and updates made in EHR: yes    Medication History Completed By: Rebekah Harman PharmD 4/17/2024 7:43 AM    PTA Med List   Medication Sig Last Dose    acyclovir (ZOVIRAX) 400 MG tablet [ACYCLOVIR (ZOVIRAX) 400 MG TABLET] Take 400 mg by mouth 3 (three) times a day as needed. Unknown    albuterol (PROAIR HFA/PROVENTIL HFA/VENTOLIN HFA) 108 (90 Base) MCG/ACT inhaler Inhale 1-2 puffs into the lungs 4 times daily as needed for shortness of breath or wheezing     hydrocortisone (CORTAID) 1 % external cream Apply 1 Application topically as needed (1 application Externally Once a day for two weeks on face due to flare)     ketoconazole (NIZORAL) 2 % shampoo [KETOCONAZOLE (NIZORAL) 2 % SHAMPOO] Apply 1 application topically as needed for itching. Apply to damp skin, lather, leave on 5 minutes, and rinse Unknown    multivitamin therapeutic (THERAGRAN) tablet [MULTIVITAMIN THERAPEUTIC (THERAGRAN) TABLET] Take 1 tablet by mouth daily. 4/16/2024 at am

## 2024-04-17 NOTE — ED NOTES
Patient returns from radiology.  Aware of approximate wait time for results. Updated on lab result, aware of additional lab draw for troponin at 0425.

## 2024-04-17 NOTE — ED NOTES
Pt notes breathing has stayed the same after 2nd dose of nitroglycerin.  /60.   adderall      

## 2024-04-17 NOTE — CONSULTS
HEART CARE CONSULTATON NOTE        Assessment/Recommendations   Assessment:  1.  Chest pain: No significant EKG changes and normal troponins however story last night was concerning with left-sided pressure rating down his arm.  It improved with nitroglycerin.  Mild coronary artery disease noted on his CT.  Recommend CT coronary angiogram for further evaluation.  Currently he continues to have chest pain that feels different from initial discomfort.  This current chest pain is more pleuritic in nature and positional and given evidence of small pericardial effusion may be related to pericarditis.  Will check a CRP and ESR as well as an echocardiogram.  2.  Mediastinal mass and inguinal lymph node oncology consult  Plan:  1.  CT coronary angiography  2.  Echocardiogram  3.  Check CRP and ESR  4.  Oncology consulted regarding findings on CT and physical exam  5.  Further recommendations pending above results  Clinically Significant Risk Factors Present on Admission                       # Obesity: Estimated body mass index is 33.91 kg/m  as calculated from the following:    Height as of this encounter: 1.829 m (6').    Weight as of this encounter: 113.4 kg (250 lb).                  History of Present Illness/Subjective    HPI: Dilan Head is a 78 year old male admitted to the hospital with complaints of chest pain.  He woke up around midnight last night with discomfort in the left side of his chest rating down into his left arm.  He laid in bed for a while however pain did not subside and so he came into the hospital.  Tums at home did not help.  Pain was about a 3 out of 10 in severity and he never felt pain like this before.  After 3 nitroglycerin the pain eventually subsided and resolved.  Currently he has 2 out of 10 chest pain that feels different.  This type of chest pain is in the mid chest area and is pleuritic in nature worse with lying flat.  He is an active person.  He walks 20 to 25 minutes a day  either at the mall or outside.  Normally feels great with activity.  No family history of heart disease and does not smoke.  His daughter is here with him at the bedside and does note that he has had increased stress in his life recently.  Found to have an inguinal lymph node that was enlarged as well as mediastinal mass on his CT chest.  Also noted to have small pericardial effusion on CT chest.  Twelve-lead EKG demonstrated normal sinus rhythm with left axis deviation, no significant ST-T abnormality       Physical Examination  Review of Systems   VITALS: /59 (BP Location: Left arm)   Pulse 68   Temp 98.2  F (36.8  C) (Oral)   Resp 26   Ht 1.829 m (6')   Wt 113.4 kg (250 lb)   SpO2 95%   BMI 33.91 kg/m    BMI: Body mass index is 33.91 kg/m .  Wt Readings from Last 3 Encounters:   04/17/24 113.4 kg (250 lb)   01/23/17 96.6 kg (213 lb)     No intake or output data in the 24 hours ending 04/17/24 1056  General Appearance:   no distress, normal body habitus   ENT/Mouth: membranes moist, no oral lesions or bleeding gums.      EYES:  no scleral icterus, normal conjunctivae   Neck: no carotid bruits or thyromegaly   Chest/Lungs:   lungs are clear to auscultation   Cardiovascular:   Regular. Normal first and second heart sounds with no murmur no edema bilaterally    Abdomen:  no organomegaly, masses, bruits, or tenderness; bowel sounds are present   Extremities: no cyanosis or clubbing   Skin: no xanthelasma, warm.    Neurologic: normal  bilateral, no tremors     Psychiatric: alert and oriented x3, calm     Review Of Systems  Skin: negative  Eyes: negative  Ears/Nose/Throat: negative  Respiratory: No shortness of breath, dyspnea on exertion, cough, or hemoptysis  Cardiovascular: negative  Gastrointestinal: negative  Genitourinary: negative  Musculoskeletal: negative  Neurologic: negative  Psychiatric: negative  Hematologic/Lymphatic/Immunologic: negative  Endocrine: negative          Lab Results   "  Chemistry/lipid CBC Cardiac Enzymes/BNP/TSH/INR   Recent Labs   Lab Test 23  1229   CHOL 200*   HDL 52   *   TRIG 76     Recent Labs   Lab Test 23  1229 18  1526 16  1523   * 141* 142*     Recent Labs   Lab Test 24  0729      POTASSIUM 3.9   CHLORIDE 109*   CO2 23   GLC 98   BUN 23.9*   CR 0.99   GFRESTIMATED 79   AUNDREA 9.0     Recent Labs   Lab Test 24  0729 24  0225 23  1229   CR 0.99 1.26* 1.01     No results for input(s): \"A1C\" in the last 25876 hours.       Recent Labs   Lab Test 24  0729   WBC 12.4*   HGB 15.1   HCT 44.2   MCV 88        Recent Labs   Lab Test 24  0729 24  0225   HGB 15.1 16.2    No results for input(s): \"TROPONINI\" in the last 98214 hours.  Recent Labs   Lab Test 24  0422   NTBNPI 45     No results for input(s): \"TSH\" in the last 07646 hours.  No results for input(s): \"INR\" in the last 13762 hours.     Medical History  Surgical History Family History Social History   Dyslipidemia Past Surgical History:   Procedure Laterality Date    APPENDECTOMY      ELBOW SURGERY      EXCISE LESION TRUNK N/A 2017    Procedure: EXCISION BACK MASS;  Surgeon: Sammy Riley MD;  Location: [facility] Main OR;  Service:      No family history of heart disease     Social History     Socioeconomic History    Marital status:      Spouse name: Not on file    Number of children: Not on file    Years of education: Not on file    Highest education level: Not on file   Occupational History    Not on file   Tobacco Use    Smoking status: Former     Current packs/day: 0.00     Types: Cigarettes     Quit date: 1987     Years since quittin.2    Smokeless tobacco: Not on file   Substance and Sexual Activity    Alcohol use: Yes     Alcohol/week: 3.0 - 4.0 standard drinks of alcohol     Comment: Alcoholic Drinks/day: per week    Drug use: No    Sexual activity: Not on file   Other Topics Concern    Not on " file   Social History Narrative    Not on file     Social Determinants of Health     Financial Resource Strain: Not on file   Food Insecurity: Not on file   Transportation Needs: Not on file   Physical Activity: Not on file   Stress: Not on file   Social Connections: Not on file   Interpersonal Safety: Not on file   Housing Stability: Not on file         Medications  Allergies   Current Outpatient Medications   Medication Sig Dispense Refill    acyclovir (ZOVIRAX) 400 MG tablet [ACYCLOVIR (ZOVIRAX) 400 MG TABLET] Take 400 mg by mouth 3 (three) times a day as needed.      albuterol (PROAIR HFA/PROVENTIL HFA/VENTOLIN HFA) 108 (90 Base) MCG/ACT inhaler Inhale 1-2 puffs into the lungs 4 times daily as needed for shortness of breath or wheezing      hydrocortisone (CORTAID) 1 % external cream Apply 1 Application topically as needed (1 application Externally Once a day for two weeks on face due to flare)      ketoconazole (NIZORAL) 2 % shampoo [KETOCONAZOLE (NIZORAL) 2 % SHAMPOO] Apply 1 application topically as needed for itching. Apply to damp skin, lather, leave on 5 minutes, and rinse      multivitamin therapeutic (THERAGRAN) tablet [MULTIVITAMIN THERAPEUTIC (THERAGRAN) TABLET] Take 1 tablet by mouth daily.        No Known Allergies      April Irving MD

## 2024-04-17 NOTE — ED NOTES
Pt does not give a numeric rating to his pressure/cramping in chest.  Pt does note it feels better/easier to breath/taking a deep breath.

## 2024-04-17 NOTE — ED PROVIDER NOTES
"EMERGENCY DEPARTMENT ENCOUNTER      NAME: Dilan Head  AGE: 78 year old male  YOB: 1945  MRN: [id number]  EVALUATION DATE & TIME: 2024  2:17 AM    PCP: Rafy Flower (Inactive)    ED PROVIDER: Sorin Alexander MD      Chief Complaint   Patient presents with    Chest Pain    Shortness of Breath         FINAL IMPRESSION:  1. Chest pain, unspecified type          ED COURSE & MEDICAL DECISION MAKIN:24 AM I met with the patient, obtained history, performed an initial exam, and discussed options and plan for diagnostics and treatment here in the ED.  5:24 AM Spoke to Dr. Moncada, Hospitalist, regarding plan for admission.    Pertinent Labs & Imaging studies reviewed. (See chart for details)  78 year old male presents to the Emergency Department for evaluation of chest pain and shortness of breath.    ED Course as of 24 0539      0220 EKG is normal sinus rhythm at a rate of 69, no ST segment changes indicative of emergent ischemia.   0238 Patient is a 78-year-old male who is previously healthy and presents the emergency department with sudden onset left-sided chest pain \"pressure\" that radiates to his left arm with associated shortness of breath.  He is not in acute distress, has normal vital signs with the exception of hypertension.  He recently traveled by flight from [place].  No leg swelling.  Differential diagnosis for these symptoms includes ACS (his EKG does not show STEMI), pulmonary embolism, pneumonia (given his recent prolonged cough).  During exam incidentally found a right inguinal mass that is firm, fixed.  Will also evaluate this with CT abdomen and pelvis.  325 aspirin provided.  Patient not in significant pain and does not require opiates at this time.   0332 No leukocytosis or anemia.  Initial troponin is 7, will trend at the 2-hour arjun.  No electrolyte abnormalities, mild increase in creatinine from prior.  No evidence of kidney failure, and " does not meet criteria for MEGAN.   0422 No acute pathology on CT PE, specifically no pulmonary embolism or concerning findings to explain the patient's symptoms.  The palpable inguinal lump that was felt on exam is a lymph node on CT abdomen, but fortunately without other suspicious findings in the abdomen.  Patient can have this lymph node followed up with his primary care provider.   0537 Patient's heart score is 4: 2 for history, 2 for age.  Given this, and his continued symptoms, recommended with shared decision making that he be admitted to the hospital for observation for continued cardiac workup, especially without a prior cardiac workup.  Will treat with nitro, in addition to Pepcid and GI cocktail in the event that acid reflux is the culprit.  Patient admitted to hospitalist service for further management.       Medical Decision Making    History:  Supplemental history from: Documented in chart  External Record(s) reviewed: Documented in chart    Work Up:  Chart documentation includes differential considered and any EKGs or imaging independently interpreted by provider, where specified.  In additional to work up documented, I considered the following work up: Documented in chart, if applicable.    External consultation:  Discussion of management with another provider: Documented in chart, if applicable    Complicating factors:  Care impacted by chronic illness: N/A  Care affected by social determinants of health: Access to Affordable Health Care    Disposition considerations: Admit.        At the conclusion of the encounter I discussed the results of all of the tests and the disposition. The questions were answered. The patient or family acknowledged understanding and was agreeable with the care plan.     0 minutes of critical care time     MEDICATIONS GIVEN IN THE EMERGENCY:  Medications   nitroGLYcerin (NITROSTAT) sublingual tablet 0.4 mg (has no administration in time range)   aspirin (ASA) tablet 325 mg  (325 mg Oral $Given 4/17/24 0246)   iopamidol (ISOVUE-370) solution 90 mL (90 mLs Intravenous $Given 4/17/24 0328)   famotidine (PEPCID) injection 20 mg (20 mg Intravenous $Given 4/17/24 0500)   alum & mag hydroxide-simethicone (MAALOX) suspension 15 mL (15 mLs Oral $Given 4/17/24 0500)       NEW PRESCRIPTIONS STARTED AT TODAY'S ER VISIT  New Prescriptions    No medications on file          =================================================================    HPI    Patient information was obtained from: Patient and patient's wife    Use of : N/A        Dilan Head is a 78 year old male with no pertinent medical history, who presents to this ED via walk-in for evaluation of chest pain and shortness of breath.    Patient reports he woke up with sudden onset of left-sided midsternum chest pain/pressure that radiates to his left anterior chest and radiates down his left arm. Rates pain 3/10. He started to become concerned when chest pain and pressure sensation persisted and notes pain is more uncomfortable than painful. Endorses associated shortness of breath and reports he feels like he can't take a full deep breath in. Notes he felt fine with no chest pain/pressure prior to sleeping. Mentions intermittent non-productive cough ongoing since visit to [place] via flight last month, per patient's wife. Denies hemoptysis. Chest pain/pressure doesn't worsen or improves with certain activities or actions. He took Tums for symptoms with no change to symptoms. Denies abdominal pain, leg swelling bilaterally and feeling nauseated. Denies prior hx of heart disease, heart attack, HF, or other cardiac conditions. No prior hx of cardiac workup like stress test. He does take Mg, D3, and multivitamin supplements daily. Denies recent surgeries, hospitalization, or prior personal hx of DVT, PE. No family hx of PE or DVT, per patient. No other reported complaints or concerns at this time.      PAST MEDICAL HISTORY:  No  past medical history on file.    PAST SURGICAL HISTORY:  Past Surgical History:   Procedure Laterality Date    APPENDECTOMY      ELBOW SURGERY      EXCISE LESION TRUNK N/A 2017    Procedure: EXCISION BACK MASS;  Surgeon: Sammy Riley MD;  Location: [facility] OR;  Service:            CURRENT MEDICATIONS:    acyclovir (ZOVIRAX) 400 MG tablet  aspirin 81 MG EC tablet  ketoconazole (NIZORAL) 2 % shampoo  multivitamin therapeutic (THERAGRAN) tablet        ALLERGIES:  No Known Allergies    FAMILY HISTORY:  No family history on file.    SOCIAL HISTORY:   Social History     Socioeconomic History    Marital status:    Tobacco Use    Smoking status: Former     Current packs/day: 0.00     Types: Cigarettes     Quit date: 1987     Years since quittin.2   Substance and Sexual Activity    Alcohol use: Yes     Alcohol/week: 3.0 - 4.0 standard drinks of alcohol     Comment: Alcoholic Drinks/day: per week    Drug use: No       VITALS:  /83   Pulse 64   Temp 97.9  F (36.6  C) (Oral)   Resp 18   Ht 1.829 m (6')   Wt 113.4 kg (250 lb)   SpO2 96%   BMI 33.91 kg/m      PHYSICAL EXAM    Physical Exam  Vitals and nursing note reviewed.   Constitutional:       General: He is not in acute distress.     Appearance: Normal appearance. He is normal weight. He is not ill-appearing.   HENT:      Head: Normocephalic and atraumatic.      Nose: Nose normal.      Mouth/Throat:      Mouth: Mucous membranes are moist.      Pharynx: Oropharynx is clear.   Eyes:      Extraocular Movements: Extraocular movements intact.      Conjunctiva/sclera: Conjunctivae normal.      Pupils: Pupils are equal, round, and reactive to light.   Cardiovascular:      Rate and Rhythm: Normal rate and regular rhythm.      Pulses: Normal pulses.      Heart sounds: Normal heart sounds. No murmur heard.  Pulmonary:      Effort: Pulmonary effort is normal. No respiratory distress.      Breath sounds: Normal breath sounds.   Abdominal:       General: Abdomen is flat. There is no distension.      Palpations: Abdomen is soft.      Tenderness: There is no abdominal tenderness.   Musculoskeletal:         General: Normal range of motion.      Cervical back: Normal range of motion.      Right lower leg: No edema.      Left lower leg: No edema.   Skin:     General: Skin is warm and dry.      Capillary Refill: Capillary refill takes less than 2 seconds.   Neurological:      General: No focal deficit present.      Mental Status: He is alert and oriented to person, place, and time. Mental status is at baseline.   Psychiatric:         Mood and Affect: Mood normal.         Behavior: Behavior normal.         Thought Content: Thought content normal.         Judgment: Judgment normal.            LAB:  All pertinent labs reviewed and interpreted.  Results for orders placed or performed during the hospital encounter of 04/17/24   CT Abdomen Pelvis w Contrast    Impression    IMPRESSION:   1.  Mildly inflamed small elongated right inguinal node, likely accounting for palpable abnormality. No suspicious abdominopelvic adenopathy. Small fat-containing right inguinal hernia.    2.  Scattered colonic diverticulosis, more apparent distally, without acute inflammation or secondary complications. Formed stool material within normal caliber redundant colon without mechanical obstruction or free gas.    3.  Heterogeneous prostatomegaly bulging into the bladder base. Cortical simple cysts in the kidneys, dominant cyst on the right anteriorly, no specific follow-up required.     4.  Normal cardiac size. Moderate coronary artery calcifications. Borderline aneurysmal ascending thoracic aorta. Atherosclerotic normal caliber distal abdominal aorta.       CT Chest Pulmonary Embolism w Contrast    Impression    IMPRESSION:  1.  No acute pulmonary embolus.  2.  Linear and dependent atelectasis in the posterior lung bases, greater on the right. Scattered emphysematous changes.  3.   4.7 x 1.3 x 5.5 cm focus in the anterior mediastinum. Adenopathy and/or thymoma not excluded.  4.  Tiny anterior pericardial effusion.     Extra Blue Top Tube   Result Value Ref Range    Hold Specimen JIC    Extra Red Top Tube   Result Value Ref Range    Hold Specimen JIC    Extra Green Top (Lithium Heparin) Tube   Result Value Ref Range    Hold Specimen JIC    Extra Purple Top Tube   Result Value Ref Range    Hold Specimen JIC    Basic metabolic panel   Result Value Ref Range    Sodium 143 135 - 145 mmol/L    Potassium 4.1 3.4 - 5.3 mmol/L    Chloride 106 98 - 107 mmol/L    Carbon Dioxide (CO2) 27 22 - 29 mmol/L    Anion Gap 10 7 - 15 mmol/L    Urea Nitrogen 27.6 (H) 8.0 - 23.0 mg/dL    Creatinine 1.26 (H) 0.67 - 1.17 mg/dL    GFR Estimate 59 (L) >60 mL/min/1.73m2    Calcium 9.2 8.8 - 10.2 mg/dL    Glucose 94 70 - 99 mg/dL   Result Value Ref Range    Troponin T, High Sensitivity 7 <=22 ng/L   CBC with platelets and differential   Result Value Ref Range    WBC Count 7.7 4.0 - 11.0 10e3/uL    RBC Count 5.47 4.40 - 5.90 10e6/uL    Hemoglobin 16.2 13.3 - 17.7 g/dL    Hematocrit 48.3 40.0 - 53.0 %    MCV 88 78 - 100 fL    MCH 29.6 26.5 - 33.0 pg    MCHC 33.5 31.5 - 36.5 g/dL    RDW 12.7 10.0 - 15.0 %    Platelet Count 240 150 - 450 10e3/uL    % Neutrophils 65 %    % Lymphocytes 21 %    % Monocytes 8 %    % Eosinophils 4 %    % Basophils 1 %    % Immature Granulocytes 0 %    NRBCs per 100 WBC 0 <1 /100    Absolute Neutrophils 5.0 1.6 - 8.3 10e3/uL    Absolute Lymphocytes 1.6 0.8 - 5.3 10e3/uL    Absolute Monocytes 0.6 0.0 - 1.3 10e3/uL    Absolute Eosinophils 0.3 0.0 - 0.7 10e3/uL    Absolute Basophils 0.1 0.0 - 0.2 10e3/uL    Absolute Immature Granulocytes 0.0 <=0.4 10e3/uL    Absolute NRBCs 0.0 10e3/uL   Result Value Ref Range    Troponin T, High Sensitivity 7 <=22 ng/L   ECG 12-LEAD WITH MUSE (LHE)   Result Value Ref Range    Systolic Blood Pressure  mmHg    Diastolic Blood Pressure  mmHg    Ventricular Rate 69 BPM     Atrial Rate 69 BPM    MO Interval 188 ms    QRS Duration 98 ms     ms    QTc 445 ms    P Axis 55 degrees    R AXIS -61 degrees    T Axis 37 degrees    Interpretation ECG       Sinus rhythm  Left axis deviation  Abnormal ECG  No previous ECGs available  Confirmed by SEE ED PROVIDER NOTE FOR, ECG INTERPRETATION ([id number]),  CHRISTIANO DAVIS ([id number]) on 4/17/2024 3:05:15 AM         RADIOLOGY:  Reviewed all pertinent imaging. Please see official radiology report.  CT Abdomen Pelvis w Contrast   Final Result   IMPRESSION:    1.  Mildly inflamed small elongated right inguinal node, likely accounting for palpable abnormality. No suspicious abdominopelvic adenopathy. Small fat-containing right inguinal hernia.      2.  Scattered colonic diverticulosis, more apparent distally, without acute inflammation or secondary complications. Formed stool material within normal caliber redundant colon without mechanical obstruction or free gas.      3.  Heterogeneous prostatomegaly bulging into the bladder base. Cortical simple cysts in the kidneys, dominant cyst on the right anteriorly, no specific follow-up required.       4.  Normal cardiac size. Moderate coronary artery calcifications. Borderline aneurysmal ascending thoracic aorta. Atherosclerotic normal caliber distal abdominal aorta.            CT Chest Pulmonary Embolism w Contrast   Final Result   IMPRESSION:   1.  No acute pulmonary embolus.   2.  Linear and dependent atelectasis in the posterior lung bases, greater on the right. Scattered emphysematous changes.   3.  4.7 x 1.3 x 5.5 cm focus in the anterior mediastinum. Adenopathy and/or thymoma not excluded.   4.  Tiny anterior pericardial effusion.             PROCEDURES:   None      [facility] [employer] System Documentation:   CMS Diagnoses:             I, Kasey Hermosillo, am serving as a scribe to document services personally performed by Sorin Alexander MD based on my observation and the provider's statements to me. I,  Sorin Alexander MD attest that Kasey Hermosillo is acting in a scribe capacity, has observed my performance of the services and has documented them in accordance with my direction.    Sorin Alexander M.D.  [facility] EMERGENCY ROOM  [address] [facility] [address]  [phone]       Sorin Alexander MD  04/17/24 0559

## 2024-04-17 NOTE — H&P
[facility] MEDICINE ADMISSION HISTORY AND PHYSICAL       Assessment & Plan      Present on Admission (POA)    1. Left sided chest pain - No PE but has Moderate coronary artery calcifications. Borderline aneurysmal ascending thoracic aorta .     - Still having chest symptoms despite negative trops/EKG. We might need to consult Cardiology - stress test VS coronary CTA  - EKG and troponins - negative- Tele and pulse ox      2. Right inguinal node measuring 1.4 cms with Mediastinal mass --- CT evidence of  4.7 x 1.3 x 5.5 cm focus in the anterior mediastinum. Adenopathy and/or thymoma not excluded.    - Need further evaluation, consider Onc referral    3. Elevated creatinine, BPH as seen on CT - Denies BPH symptoms   - check UA, BMP, IVF   - advised prostate check as outpatient     4. Cough, CT chest - Scattered emphysematous changes. Remote smoking history   - COVID test, BNP check      VTE prophylaxis --  SCDs, if appropriate, add SQH  Diet --  NPO  Code Status -- Full,  Barriers to discharge -- Admitting/Acute medical condition/s  Discharge Disposition and goals --  Unable to determine at this point, pending progress/treatment response.     PPE - I was wearing PPE including but not limited to - N95 mask, Gloves, and/or Safety glasses.  Admission Status -- Observation     Care plan is based on available information and patient's condition at encounter. Care plan was discussed and agreed to proceed by the patient/family member. Made aware that care plan may change.     I recommend to review/revise history/care plan for information/results not available during my encounter. All or some home medication/s were not resumed or was modified on admission due to safety concerns. Will have rounding AM doc  review safety to resume held/modified home medications.     Availability -- If need to coordinate care after night shift  -- Contact assigned AM rounding Hospitalist.     75 minutes spent by me on  the date of service doing chart review, history, exam, diagnostic test results interpretation, care coordination with RN, RT and/or Pharm, & further activities per the note.      Claudio Moncada MD, MPH, FACP, [facility]  Internal Medicine - Hospitalist        Chief Complaint Chest pain      HISTORY     - Patient was seen in ED - 1. Met his wife too.    - He is here with CP. He said it woke him up. It was left sided and radiated to left arm. He also mentioned mid chest tightness. Also has cough for several weeks. Reported also some intentional weight loss.    - Denies fever. No SOB. No belly pain. No diarrhea.     - ED course/treatments/referral - EKG trop CT chest/abdomen, elev creatinine.        - ROS --- No headache. No dizziness. No weakness. No palpitations. No abdominal pain. No nausea or vomiting. No urinary symptoms. No bleeding symptoms. No weight loss. Rest of 12 point ROS was reviewed and negative.       Past Medical History     No past medical history on file.      Surgical History     Past Surgical History:   Procedure Laterality Date    APPENDECTOMY      ELBOW SURGERY      EXCISE LESION TRUNK N/A 2017    Procedure: EXCISION BACK MASS;  Surgeon: Sammy Riley MD;  Location: [facility];  Service:         Family History      No family history on file.      Social History      .  Social History     Socioeconomic History    Marital status:      Spouse name: Not on file    Number of children: Not on file    Years of education: Not on file    Highest education level: Not on file   Occupational History    Not on file   Tobacco Use    Smoking status: Former     Current packs/day: 0.00     Types: Cigarettes     Quit date: 1987     Years since quittin.2    Smokeless tobacco: Not on file   Substance and Sexual Activity    Alcohol use: Yes     Alcohol/week: 3.0 - 4.0 standard drinks of alcohol     Comment: Alcoholic Drinks/day: per week    Drug use: No    Sexual activity: Not on file    Other Topics Concern    Not on file   Social History Narrative    Not on file     Social Determinants of Health     Financial Resource Strain: Not on file   Food Insecurity: Not on file   Transportation Needs: Not on file   Physical Activity: Not on file   Stress: Not on file   Social Connections: Not on file   Interpersonal Safety: Not on file   Housing Stability: Not on file          Allergies      No Known Allergies      Prior to Admission Medications      No current facility-administered medications for this encounter.     Current Outpatient Medications   Medication Sig Dispense Refill    acyclovir (ZOVIRAX) 400 MG tablet [ACYCLOVIR (ZOVIRAX) 400 MG TABLET] Take 400 mg by mouth 3 (three) times a day as needed.      aspirin 81 MG EC tablet [ASPIRIN 81 MG EC TABLET] Take 81 mg by mouth daily.      ketoconazole (NIZORAL) 2 % shampoo [KETOCONAZOLE (NIZORAL) 2 % SHAMPOO] Apply 1 application topically as needed for itching. Apply to damp skin, lather, leave on 5 minutes, and rinse      multivitamin therapeutic (THERAGRAN) tablet [MULTIVITAMIN THERAPEUTIC (THERAGRAN) TABLET] Take 1 tablet by mouth daily.             Review of Systems     A 12 point comprehensive review of systems was negative except as noted above in HPI.    PHYSICAL EXAMINATION       Vitals      Vitals: /83   Pulse 64   Temp 97.9  F (36.6  C) (Oral)   Resp 18   Ht 1.829 m (6')   Wt 113.4 kg (250 lb)   SpO2 96%   BMI 33.91 kg/m    BMI= Body mass index is 33.91 kg/m .      Examination     General Appearance:  Alert, cooperative, no distress  Head:    Normocephalic, without obvious abnormality, atraumatic  EENT:  PERRL, conjunctiva/corneas clear, EOM's intact.   Neck:   Supple, symmetrical, trachea midline, no adenopathy; no NVE  Back:  Symmetric, no curvature, no CVA tenderness  Chest/Lungs: Clear to auscultation bilaterally, respirations unlabored, No tenderness or deformity. No abdominal breathing or use of accessory muscles.   Heart:     Regular rate and rhythm, S1 and S2 normal, no murmur, rub   or gallop  Abdomen: Soft, non-tender, bowel sounds active all four quadrants, not peritoneal on palpation. Not distended  Extremities:  Extremities normal, atraumatic, no swelling - right inguinal node tender   Skin:  Skin color, texture, turgor normal, no rashes or lesion  Neurologic:  Awake and alert,  No lateralizing or localizing signs          Pertinent Lab     Results for orders placed or performed during the hospital encounter of 04/17/24   CT Abdomen Pelvis w Contrast    Impression    IMPRESSION:   1.  Mildly inflamed small elongated right inguinal node, likely accounting for palpable abnormality. No suspicious abdominopelvic adenopathy. Small fat-containing right inguinal hernia.    2.  Scattered colonic diverticulosis, more apparent distally, without acute inflammation or secondary complications. Formed stool material within normal caliber redundant colon without mechanical obstruction or free gas.    3.  Heterogeneous prostatomegaly bulging into the bladder base. Cortical simple cysts in the kidneys, dominant cyst on the right anteriorly, no specific follow-up required.     4.  Normal cardiac size. Moderate coronary artery calcifications. Borderline aneurysmal ascending thoracic aorta. Atherosclerotic normal caliber distal abdominal aorta.       CT Chest Pulmonary Embolism w Contrast    Impression    IMPRESSION:  1.  No acute pulmonary embolus.  2.  Linear and dependent atelectasis in the posterior lung bases, greater on the right. Scattered emphysematous changes.  3.  4.7 x 1.3 x 5.5 cm focus in the anterior mediastinum. Adenopathy and/or thymoma not excluded.  4.  Tiny anterior pericardial effusion.     Extra Blue Top Tube   Result Value Ref Range    Hold Specimen JIC    Extra Red Top Tube   Result Value Ref Range    Hold Specimen JIC    Extra Green Top (Lithium Heparin) Tube   Result Value Ref Range    Hold Specimen JIC    Extra Purple Top  Tube   Result Value Ref Range    Hold Specimen [facility]    Basic metabolic panel   Result Value Ref Range    Sodium 143 135 - 145 mmol/L    Potassium 4.1 3.4 - 5.3 mmol/L    Chloride 106 98 - 107 mmol/L    Carbon Dioxide (CO2) 27 22 - 29 mmol/L    Anion Gap 10 7 - 15 mmol/L    Urea Nitrogen 27.6 (H) 8.0 - 23.0 mg/dL    Creatinine 1.26 (H) 0.67 - 1.17 mg/dL    GFR Estimate 59 (L) >60 mL/min/1.73m2    Calcium 9.2 8.8 - 10.2 mg/dL    Glucose 94 70 - 99 mg/dL   Result Value Ref Range    Troponin T, High Sensitivity 7 <=22 ng/L   CBC with platelets and differential   Result Value Ref Range    WBC Count 7.7 4.0 - 11.0 10e3/uL    RBC Count 5.47 4.40 - 5.90 10e6/uL    Hemoglobin 16.2 13.3 - 17.7 g/dL    Hematocrit 48.3 40.0 - 53.0 %    MCV 88 78 - 100 fL    MCH 29.6 26.5 - 33.0 pg    MCHC 33.5 31.5 - 36.5 g/dL    RDW 12.7 10.0 - 15.0 %    Platelet Count 240 150 - 450 10e3/uL    % Neutrophils 65 %    % Lymphocytes 21 %    % Monocytes 8 %    % Eosinophils 4 %    % Basophils 1 %    % Immature Granulocytes 0 %    NRBCs per 100 WBC 0 <1 /100    Absolute Neutrophils 5.0 1.6 - 8.3 10e3/uL    Absolute Lymphocytes 1.6 0.8 - 5.3 10e3/uL    Absolute Monocytes 0.6 0.0 - 1.3 10e3/uL    Absolute Eosinophils 0.3 0.0 - 0.7 10e3/uL    Absolute Basophils 0.1 0.0 - 0.2 10e3/uL    Absolute Immature Granulocytes 0.0 <=0.4 10e3/uL    Absolute NRBCs 0.0 10e3/uL   Result Value Ref Range    Troponin T, High Sensitivity 7 <=22 ng/L   ECG 12-LEAD WITH MUSE (LHE)   Result Value Ref Range    Systolic Blood Pressure  mmHg    Diastolic Blood Pressure  mmHg    Ventricular Rate 69 BPM    Atrial Rate 69 BPM    LA Interval 188 ms    QRS Duration 98 ms     ms    QTc 445 ms    P Axis 55 degrees    R AXIS -61 degrees    T Axis 37 degrees    Interpretation ECG       Sinus rhythm  Left axis deviation  Abnormal ECG  No previous ECGs available  Confirmed by SEE ED PROVIDER NOTE FOR, ECG INTERPRETATION ([id number]),  CHRISTIANO DAVIS ([id number]) on 4/17/2024 3:05:15 AM              Pertinent Radiology

## 2024-04-17 NOTE — PLAN OF CARE
Pt denies chest pain during morning assessment. Plan is for pt to have Echo and CT Coronary Artery Angio sometime this shift. NPO.  Ofelia Montanez RN on 4/17/2024 at 9:16 AM   Problem: Chest Pain  Goal: Resolution of Chest Pain Symptoms  Outcome: Progressing   Goal Outcome Evaluation:

## 2024-04-17 NOTE — PROGRESS NOTES
Coronary CT Angiogram:  Patient tolerated procedure well. VSS. Nitroglycerin 0.4mg SL administered to patient per cardiology policy along with Metoprolol 5mg IV for HR control. Encouraged patient to drink 6-8 glasses of water as diet tolerates due to receiving IV contrast. No further dietary restrictions according to CCTA. Results pending cardiology interpretation.    Jeanne Jiménez RN  [facility]

## 2024-04-17 NOTE — CONSULTS
CTA chest 4/17/2024 reviewed. 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 Can proceed with CT guided anterior mediastinal biopsy under moderate sedation, which can be done either in the inpatient or outpatient setting. Please make NPO prior to biopsy and hold ASA.     Dmitry Bello MD

## 2024-04-18 VITALS
TEMPERATURE: 98.4 F | BODY MASS INDEX: 33.86 KG/M2 | OXYGEN SATURATION: 93 % | HEIGHT: 72 IN | RESPIRATION RATE: 17 BRPM | WEIGHT: 250 LBS | HEART RATE: 78 BPM | DIASTOLIC BLOOD PRESSURE: 70 MMHG | SYSTOLIC BLOOD PRESSURE: 128 MMHG

## 2024-04-18 LAB
HOLD SPECIMEN: NORMAL
MAGNESIUM SERPL-MCNC: 2 MG/DL (ref 1.7–2.3)
POTASSIUM SERPL-SCNC: 4 MMOL/L (ref 3.4–5.3)

## 2024-04-18 PROCEDURE — 36415 COLL VENOUS BLD VENIPUNCTURE: CPT | Performed by: FAMILY MEDICINE

## 2024-04-18 PROCEDURE — 96376 TX/PRO/DX INJ SAME DRUG ADON: CPT

## 2024-04-18 PROCEDURE — 99207 PR NO CHARGE LOS: CPT | Performed by: INTERNAL MEDICINE

## 2024-04-18 PROCEDURE — 250N000013 HC RX MED GY IP 250 OP 250 PS 637: Performed by: FAMILY MEDICINE

## 2024-04-18 PROCEDURE — 84132 ASSAY OF SERUM POTASSIUM: CPT | Performed by: INTERNAL MEDICINE

## 2024-04-18 PROCEDURE — 250N000011 HC RX IP 250 OP 636: Performed by: FAMILY MEDICINE

## 2024-04-18 PROCEDURE — G0378 HOSPITAL OBSERVATION PER HR: HCPCS

## 2024-04-18 PROCEDURE — 250N000013 HC RX MED GY IP 250 OP 250 PS 637: Performed by: INTERNAL MEDICINE

## 2024-04-18 PROCEDURE — 83735 ASSAY OF MAGNESIUM: CPT | Performed by: FAMILY MEDICINE

## 2024-04-18 PROCEDURE — C9113 INJ PANTOPRAZOLE SODIUM, VIA: HCPCS | Performed by: FAMILY MEDICINE

## 2024-04-18 RX ORDER — ACETAMINOPHEN 500 MG
1000 TABLET ORAL 3 TIMES DAILY
COMMUNITY
Start: 2024-04-18

## 2024-04-18 RX ORDER — TRAZODONE HYDROCHLORIDE 50 MG/1
25-50 TABLET, FILM COATED ORAL
Qty: 30 TABLET | Refills: 0 | Status: SHIPPED | OUTPATIENT
Start: 2024-04-18

## 2024-04-18 RX ORDER — OXYCODONE HYDROCHLORIDE 5 MG/1
5 TABLET ORAL EVERY 4 HOURS PRN
Qty: 40 TABLET | Refills: 0 | Status: SHIPPED | OUTPATIENT
Start: 2024-04-18

## 2024-04-18 RX ORDER — PANTOPRAZOLE SODIUM 40 MG/1
40 TABLET, DELAYED RELEASE ORAL
Qty: 30 TABLET | Refills: 0 | Status: SHIPPED | OUTPATIENT
Start: 2024-04-19 | End: 2024-07-01

## 2024-04-18 RX ORDER — PANTOPRAZOLE SODIUM 40 MG/1
40 TABLET, DELAYED RELEASE ORAL
Status: DISCONTINUED | OUTPATIENT
Start: 2024-04-19 | End: 2024-04-18 | Stop reason: HOSPADM

## 2024-04-18 RX ADMIN — OXYCODONE HYDROCHLORIDE 5 MG: 5 TABLET ORAL at 07:27

## 2024-04-18 RX ADMIN — CALCIUM CARBONATE (ANTACID) CHEW TAB 500 MG 1000 MG: 500 CHEW TAB at 00:33

## 2024-04-18 RX ADMIN — PANTOPRAZOLE SODIUM 40 MG: 40 INJECTION, POWDER, FOR SOLUTION INTRAVENOUS at 07:27

## 2024-04-18 RX ADMIN — ACETAMINOPHEN 650 MG: 325 TABLET ORAL at 07:27

## 2024-04-18 RX ADMIN — OXYCODONE HYDROCHLORIDE 10 MG: 5 TABLET ORAL at 00:27

## 2024-04-18 ASSESSMENT — ACTIVITIES OF DAILY LIVING (ADL)
ADLS_ACUITY_SCORE: 24

## 2024-04-18 NOTE — PROGRESS NOTES
PRIMARY DIAGNOSIS: ACUTE PAIN  OUTPATIENT/OBSERVATION GOALS TO BE MET BEFORE DISCHARGE:  1. Pain Status: Improved-controlled with oral pain medications.    2. Return to near baseline physical activity: Yes  chest pain increases with inhalation, A1 for safety     3. Cleared for discharge by consultants (if involved): No    Discharge Planner Nurse   Safe discharge environment identified: Yes  Barriers to discharge: Yes       Entered by: Argelia Brumfield RN 04/18/2024 2:17 AM     NPO since midnight for procedure in morning. PRN oxycodone 10mg effective to reduce pain. On cardiac  monitoring, stable on RA.

## 2024-04-18 NOTE — PROGRESS NOTES
Chart check: Echocardiogram unremarkable without significant structural heart disease and no evidence of pericardial effusion.  Inflammatory markers were negative and CT coronary angiogram did not show any obstructive disease.  No further cardiac recommendations at this time.  Please call if any further questions or concerns.

## 2024-04-18 NOTE — PLAN OF CARE
Pt's has more improvement in chest pain/discomfort, feels like per pt that the Oxy and Tynelolo together have helped. Cardiac workout unremarkable. Pt to discharge home and to have MRI outpatient. Discharge instructions printed questions/concerns answered. IV taken out. Waiting for pt's wife to come pick pt up.   Ofelia Montanez, RN on 4/18/2024 at 11:15 AM   Problem: Chest Pain  Goal: Resolution of Chest Pain Symptoms  Outcome: Progressing  Intervention: Manage Acute Chest Pain  Recent Flowsheet Documentation  Taken 4/18/2024 0700 by Ofelia Montanez, RN  Chest Pain Intervention: cardiac monitoring continued     Problem: Pain Acute  Goal: Optimal Pain Control and Function  Outcome: Progressing  Intervention: Prevent or Manage Pain  Recent Flowsheet Documentation  Taken 4/18/2024 0700 by Ofelia Montanez, RN  Medication Review/Management: medications reviewed   Goal Outcome Evaluation:

## 2024-04-18 NOTE — PROGRESS NOTES
PRIMARY DIAGNOSIS: CHEST PAIN  OUTPATIENT/OBSERVATION GOALS TO BE MET BEFORE DISCHARGE:  1. Ruled out acute coronary syndrome (negative or stable Troponin):  Yes  2. Pain Status: Improved-controlled with oral pain medications.  3. Appropriate provocative testing performed: Yes  - Stress Test Procedure: n/a   - Interpretation of cardiac rhythm per telemetry tech: SR    4. Cleared by Consultants (if applicable):No  5. Return to near baseline physical activity: No  Discharge Planner Nurse   Safe discharge environment identified: Yes  Barriers to discharge: Yes       Entered by: Leeann Kern RN 04/18/2024 6:06 AM     Please review provider order for any additional goals.   Nurse to notify provider when observation goals have been met and patient is ready for discharge.  Pt is a/ox4. Pt rated chest pain 2/10 at rest. Pt reported SOB even at rest.  Resp: 92% at room air. Lungs are clear.

## 2024-04-18 NOTE — PROGRESS NOTES
PRIMARY DIAGNOSIS: ACUTE PAIN  OUTPATIENT/OBSERVATION GOALS TO BE MET BEFORE DISCHARGE:  1. Pain Status: Improved-controlled with oral pain medications.    2. Return to near baseline physical activity: Yes    3. Cleared for discharge by consultants (if involved): No    Discharge Planner Nurse   Safe discharge environment identified: Yes  Barriers to discharge: Yes       Entered by: Rachel Baird RN 04/17/2024 9:50 PM

## 2024-05-02 ENCOUNTER — TRANSFERRED RECORDS (OUTPATIENT)
Dept: HEALTH INFORMATION MANAGEMENT | Facility: CLINIC | Age: 79
End: 2024-05-02
Payer: COMMERCIAL

## 2024-05-07 ENCOUNTER — HOSPITAL ENCOUNTER (OUTPATIENT)
Dept: MRI IMAGING | Facility: HOSPITAL | Age: 79
Discharge: HOME | End: 2024-05-07
Attending: FAMILY MEDICINE | Admitting: FAMILY MEDICINE
Payer: COMMERCIAL

## 2024-05-07 DIAGNOSIS — R07.9 CHEST PAIN, UNSPECIFIED TYPE: ICD-10-CM

## 2024-05-07 PROCEDURE — 71552 MRI CHEST W/O & W/DYE: CPT

## 2024-05-07 PROCEDURE — A9585 GADOBUTROL INJECTION: HCPCS | Performed by: FAMILY MEDICINE

## 2024-05-07 PROCEDURE — 255N000002 HC RX 255 OP 636: Performed by: FAMILY MEDICINE

## 2024-05-07 RX ORDER — GADOBUTROL 604.72 MG/ML
10 INJECTION INTRAVENOUS ONCE
Status: COMPLETED | OUTPATIENT
Start: 2024-05-07 | End: 2024-05-07

## 2024-05-07 RX ADMIN — GADOBUTROL 10 ML: 604.72 INJECTION INTRAVENOUS at 10:13

## 2024-05-20 ENCOUNTER — MEDICAL CORRESPONDENCE (OUTPATIENT)
Dept: HEALTH INFORMATION MANAGEMENT | Facility: CLINIC | Age: 79
End: 2024-05-20
Payer: COMMERCIAL

## 2024-05-22 DIAGNOSIS — R05.3 CHRONIC COUGH: Primary | ICD-10-CM

## 2024-06-24 ENCOUNTER — LAB REQUISITION (OUTPATIENT)
Dept: LAB | Facility: CLINIC | Age: 79
End: 2024-06-24

## 2024-06-24 DIAGNOSIS — E78.5 HYPERLIPIDEMIA, UNSPECIFIED: ICD-10-CM

## 2024-06-24 DIAGNOSIS — Z11.59 ENCOUNTER FOR SCREENING FOR OTHER VIRAL DISEASES: ICD-10-CM

## 2024-06-24 LAB
ALBUMIN SERPL BCG-MCNC: 4.1 G/DL (ref 3.5–5.2)
ALP SERPL-CCNC: 97 U/L (ref 40–150)
ALT SERPL W P-5'-P-CCNC: 17 U/L (ref 0–70)
ANION GAP SERPL CALCULATED.3IONS-SCNC: 10 MMOL/L (ref 7–15)
AST SERPL W P-5'-P-CCNC: 21 U/L (ref 0–45)
BILIRUB SERPL-MCNC: 0.5 MG/DL
BUN SERPL-MCNC: 20.4 MG/DL (ref 8–23)
CALCIUM SERPL-MCNC: 9.6 MG/DL (ref 8.8–10.2)
CHLORIDE SERPL-SCNC: 104 MMOL/L (ref 98–107)
CHOLEST SERPL-MCNC: 201 MG/DL
CREAT SERPL-MCNC: 1.01 MG/DL (ref 0.67–1.17)
DEPRECATED HCO3 PLAS-SCNC: 24 MMOL/L (ref 22–29)
EGFRCR SERPLBLD CKD-EPI 2021: 77 ML/MIN/1.73M2
FASTING STATUS PATIENT QL REPORTED: ABNORMAL
FASTING STATUS PATIENT QL REPORTED: NORMAL
GLUCOSE SERPL-MCNC: 85 MG/DL (ref 70–99)
HCV AB SERPL QL IA: NONREACTIVE
HDLC SERPL-MCNC: 48 MG/DL
LDLC SERPL CALC-MCNC: 137 MG/DL
NONHDLC SERPL-MCNC: 153 MG/DL
POTASSIUM SERPL-SCNC: 3.9 MMOL/L (ref 3.4–5.3)
PROT SERPL-MCNC: 7.3 G/DL (ref 6.4–8.3)
SODIUM SERPL-SCNC: 138 MMOL/L (ref 135–145)
TRIGL SERPL-MCNC: 81 MG/DL

## 2024-06-24 PROCEDURE — 80053 COMPREHEN METABOLIC PANEL: CPT | Performed by: FAMILY MEDICINE

## 2024-06-24 PROCEDURE — 86803 HEPATITIS C AB TEST: CPT | Performed by: FAMILY MEDICINE

## 2024-06-24 PROCEDURE — 80061 LIPID PANEL: CPT | Performed by: FAMILY MEDICINE

## 2024-07-01 ENCOUNTER — OFFICE VISIT (OUTPATIENT)
Dept: PULMONOLOGY | Facility: CLINIC | Age: 79
End: 2024-07-01
Payer: COMMERCIAL

## 2024-07-01 VITALS
HEIGHT: 71 IN | HEART RATE: 80 BPM | OXYGEN SATURATION: 98 % | SYSTOLIC BLOOD PRESSURE: 130 MMHG | DIASTOLIC BLOOD PRESSURE: 76 MMHG | BODY MASS INDEX: 34.87 KG/M2

## 2024-07-01 DIAGNOSIS — R05.3 CHRONIC COUGH: ICD-10-CM

## 2024-07-01 DIAGNOSIS — K21.9 GASTROESOPHAGEAL REFLUX DISEASE WITHOUT ESOPHAGITIS: Primary | ICD-10-CM

## 2024-07-01 DIAGNOSIS — R13.10 DYSPHAGIA, UNSPECIFIED TYPE: ICD-10-CM

## 2024-07-01 DIAGNOSIS — R07.9 CHEST PAIN, UNSPECIFIED TYPE: ICD-10-CM

## 2024-07-01 DIAGNOSIS — R09.89 THROAT CLEARING: ICD-10-CM

## 2024-07-01 DIAGNOSIS — E32.8 THYMIC CYST (H): ICD-10-CM

## 2024-07-01 LAB
DLCOCOR-%PRED-PRE: 106 %
DLCOCOR-PRE: 27.08 ML/MIN/MMHG
DLCOUNC-%PRED-PRE: 106 %
DLCOUNC-PRE: 27.15 ML/MIN/MMHG
DLCOUNC-PRED: 25.54 ML/MIN/MMHG
ERV-%PRED-PRE: 107 %
ERV-PRE: 1.54 L
ERV-PRED: 1.43 L
EXPTIME-PRE: 10.01 SEC
FEF2575-%PRED-PRE: 147 %
FEF2575-PRE: 3.09 L/SEC
FEF2575-PRED: 2.09 L/SEC
FEFMAX-%PRED-PRE: 103 %
FEFMAX-PRE: 8.13 L/SEC
FEFMAX-PRED: 7.84 L/SEC
FEV1-%PRED-PRE: 127 %
FEV1-PRE: 3.64 L
FEV1FEV6-PRE: 78 %
FEV1FEV6-PRED: 77 %
FEV1FVC-PRE: 77 %
FEV1FVC-PRED: 76 %
FEV1SVC-PRE: 74 %
FEV1SVC-PRED: 70 %
FIFMAX-PRE: 5.82 L/SEC
FRCPLETH-%PRED-PRE: 108 %
FRCPLETH-PRE: 4.16 L
FRCPLETH-PRED: 3.82 L
FVC-%PRED-PRE: 124 %
FVC-PRE: 4.74 L
FVC-PRED: 3.81 L
HGB BLD-MCNC: 14.7 G/DL
IC-%PRED-PRE: 117 %
IC-PRE: 3.36 L
IC-PRED: 2.86 L
RVPLETH-%PRED-PRE: 93 %
RVPLETH-PRE: 2.63 L
RVPLETH-PRED: 2.82 L
TLCPLETH-%PRED-PRE: 102 %
TLCPLETH-PRE: 7.52 L
TLCPLETH-PRED: 7.33 L
VA-%PRED-PRE: 100 %
VA-PRE: 6.63 L
VC-%PRED-PRE: 118 %
VC-PRE: 4.89 L
VC-PRED: 4.12 L

## 2024-07-01 PROCEDURE — 94729 DIFFUSING CAPACITY: CPT | Mod: 26 | Performed by: INTERNAL MEDICINE

## 2024-07-01 PROCEDURE — 94726 PLETHYSMOGRAPHY LUNG VOLUMES: CPT | Mod: 26 | Performed by: INTERNAL MEDICINE

## 2024-07-01 PROCEDURE — 99205 OFFICE O/P NEW HI 60 MIN: CPT | Mod: 25 | Performed by: INTERNAL MEDICINE

## 2024-07-01 PROCEDURE — 85018 HEMOGLOBIN: CPT | Mod: QW | Performed by: INTERNAL MEDICINE

## 2024-07-01 PROCEDURE — 94375 RESPIRATORY FLOW VOLUME LOOP: CPT | Mod: 26 | Performed by: INTERNAL MEDICINE

## 2024-07-01 RX ORDER — PANTOPRAZOLE SODIUM 40 MG/1
40 TABLET, DELAYED RELEASE ORAL
Qty: 30 TABLET | Refills: 11 | Status: SHIPPED | OUTPATIENT
Start: 2024-07-01

## 2024-07-01 RX ORDER — GUAIFENESIN 600 MG/1
600-1200 TABLET, EXTENDED RELEASE ORAL 2 TIMES DAILY PRN
Qty: 120 TABLET | Refills: 11 | Status: SHIPPED | OUTPATIENT
Start: 2024-07-01

## 2024-07-01 RX ORDER — FLUTICASONE PROPIONATE 50 MCG
1 SPRAY, SUSPENSION (ML) NASAL 2 TIMES DAILY
COMMUNITY

## 2024-07-01 NOTE — PROGRESS NOTES
Pulmonary Clinic Outpatient Consultation    Assessment and Plan:  78 year old male former smoker with a history of obesity, GERD, untreated TOD, insomnia, thymic cyst, presenting for evaluation of chronic throat clearing and dyspnea.    Chronic throat clearing and dyspnea: Mostly phlegm in the throat but does note some cough, especially with swallowing. Present for 3 months, possibly longer. Ran out of pantoprazole 2 days ago and symptoms worsened. He walks, no regular aerobic or resistance exercise. Lung function is completely normal. Chest CT in May 2024, showed bibasilar fibroatelectasis, likely due to obesity, possible component of aspiration. Above issues likely worsened by untreated TOD; states that he was given CPAP in the past but did not tolerate it. Has used levalbuterol HFA but has not been beneficial; no clear evidence of asthma. 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 Really does not require follow-up imaging, but can obtain a 12-month CT in May 2025 to be on the safe side.    Plan:  - refilled pantoprazole 40 mg daily.  - referral to SLP for VFSS  - has EGD and colonoscopy scheduled for later this week  - advised reevaluation for his untreated TOD; he does not want a referral at this time  - encouraged regular aerobic and resistance exercise to build cardiovascular and muscle conditioning  - chest CT in May 2025 to follow up thymic cyst  - recommend remaining up to date with respiratory vaccinations  - follow up in 3 months  - encouraged him to contact us with questions or concerning symptoms    Morris Otto MD  [facility]  Office [phone]  Pager [phone]  he/him    CCx: chronic throat clearing and dyspnea    HPI: 78 year old male former smoker with a history of obesity, GERD, untreated TOD, insomnia, thymic cyst, presenting for evaluation of chronic throat clearing and dyspnea. States that on 4/17/24 he developed acute chest pain,  cough, and dyspnea. Went to ED, had chest CTA showing no PE, very small left pleural effusion, bibasilar fibroatelectasis, possible LLL pneumonitis. States that he coughs with swallowing. Cough improved with pantoprazole, which he ran out of 2 days ago. Had heart racing with albuterol. Tolerates levalbuterol but has not helped his dyspnea. Has dyspnea with walking, doing yard work. Walks a mile per day, does not do more strenuous exercise. Has whistling snore with sleeping per spouse. Patient states he was diagnosed with TOD but did not tolerate CPAP. No childhood asthma. Has some nasal congestion. Started smoking at age 20, up to <1 ppd, quit in his 20s. No Fhx of lung disease.     ROS:  A 12-system review was obtained and was negative with the exception of the symptoms endorsed in the history of present illness.    PMH:  former smoker with a history of obesity, GERD, untreated TOD, insomnia, thymic cyst    PSH:  Past Surgical History:   Procedure Laterality Date    APPENDECTOMY      ELBOW SURGERY      EXCISE LESION TRUNK N/A 2017    Procedure: EXCISION BACK MASS;  Surgeon: Sammy Riley MD;  Location: [facility] OR;  Service:        Allergies:  No Known Allergies    Family HX:  No family history on file.    Social Hx:  Social History     Socioeconomic History    Marital status:      Spouse name: Not on file    Number of children: Not on file    Years of education: Not on file    Highest education level: Not on file   Occupational History    Not on file   Tobacco Use    Smoking status: Former     Current packs/day: 0.00     Types: Cigarettes     Quit date: 1987     Years since quittin.4    Smokeless tobacco: Never   Vaping Use    Vaping status: Never Used   Substance and Sexual Activity    Alcohol use: Yes     Alcohol/week: 3.0 - 4.0 standard drinks of alcohol     Comment: Alcoholic Drinks/day: per week    Drug use: No    Sexual activity: Not on file   Other Topics Concern    Not on  file   Social History Narrative    Not on file     Social Determinants of Health     Financial Resource Strain: Not on file   Food Insecurity: Not on file   Transportation Needs: Not on file   Physical Activity: Not on file   Stress: Not on file   Social Connections: Not on file   Interpersonal Safety: Not on file   Housing Stability: Not on file       Current Meds:  Current Outpatient Medications   Medication Sig Dispense Refill    acetaminophen (TYLENOL) 500 MG tablet Take 2 tablets (1,000 mg) by mouth 3 times daily (Patient taking differently: Take 1,000 mg by mouth every 8 hours as needed)      acyclovir (ZOVIRAX) 400 MG tablet [ACYCLOVIR (ZOVIRAX) 400 MG TABLET] Take 400 mg by mouth 3 (three) times a day as needed.      albuterol (PROAIR HFA/PROVENTIL HFA/VENTOLIN HFA) 108 (90 Base) MCG/ACT inhaler Inhale 1-2 puffs into the lungs 4 times daily as needed for shortness of breath or wheezing      fluticasone (FLONASE) 50 MCG/ACT nasal spray Spray 1 spray into both nostrils 2 times daily      guaiFENesin (MUCINEX) 600 MG 12 hr tablet Take 1-2 tablets (600-1,200 mg) by mouth 2 times daily as needed for congestion 120 tablet 11    hydrocortisone (CORTAID) 1 % external cream Apply 1 Application topically as needed (1 application Externally Once a day for two weeks on face due to flare)      multivitamin therapeutic (THERAGRAN) tablet [MULTIVITAMIN THERAPEUTIC (THERAGRAN) TABLET] Take 1 tablet by mouth daily.      oxyCODONE (ROXICODONE) 5 MG tablet Take 1 tablet (5 mg) by mouth every 4 hours as needed for breakthrough pain 40 tablet 0    pantoprazole (PROTONIX) 40 MG EC tablet Take 1 tablet (40 mg) by mouth every morning (before breakfast) 30 tablet 11    traZODone (DESYREL) 50 MG tablet Take 0.5-1 tablets (25-50 mg) by mouth nightly as needed for sleep 30 tablet 0    ketoconazole (NIZORAL) 2 % shampoo [KETOCONAZOLE (NIZORAL) 2 % SHAMPOO] Apply 1 application topically as needed for itching. Apply to damp skin, lather,  "leave on 5 minutes, and rinse (Patient not taking: Reported on 7/1/2024)         Physical Exam:  /76 (BP Location: Left arm, Patient Position: Chair, Cuff Size: Adult Regular)   Pulse 80   Ht 1.803 m (5' 11\")   SpO2 98%   BMI 34.87 kg/m    Gen: alert, oriented, no distress  HEENT: nasal mucosa is mildly edematous, no oropharyngeal lesions, no cervical or supraclavicular lymphadenopathy  CV: RRR, no M/G/R  Resp: CTAB, no focal crackles or wheezes  Skin: no apparent rashes  Ext: no cyanosis, clubbing or edema  Neuro: alert, nonfocal    Labs:  reviewed    Imaging:  Chest CTA (May 2024):  - images directly reviewed, formal interpretation follows:  FINDINGS:   ANGIOGRAM CHEST: Mild motion. No pulmonary embolus. Nonaneurysmal aorta without dissection.     LUNGS AND PLEURA: Motion artifact. New small left pleural effusion. Moderate left and mild right lower lobe predominant volume loss and atelectasis. No pneumothorax.     MEDIASTINUM/AXILLAE: No adenopathy. Stable anterior mediastinal hypoattenuating lesion (series 4, image 68). New small pericardial effusion.     CORONARY ARTERY CALCIFICATION: Motion artifact.     UPPER ABDOMEN: Small hiatus hernia.     MUSCULOSKELETAL: Normal.    1.  No pulmonary artery embolism identified.     2.  Nonaneurysmal aorta without dissection.     3.  New small left pleural effusion, nonspecific in etiology. New small pericardial effusion.     4.  Moderate left and mild right lower lobe predominant volume loss and atelectasis. Remaining lungs are grossly clear.     5.  Stable anterior mediastinal hypoattenuating lesion. Thymic lesion is suspected. MRI with contrast recommended to evaluate solid versus cystic components. Surgical consultation could also be performed.    TTE (April 2024):  1. Technically difficult study.  2. Normal left ventricular size and systolic performance with a visually  estimated ejection fraction of 55-60%.  3. No significant valvular heart disease is " identified on this study.  4. Probable normal right ventricular size and systolic performance though  right-sided structures are not clearly visualized on all views on this study.    Pulmonary Function Testing  July 2024  Normal complete PFT    Time spent on chart and image review, meeting with the patient to obtain history, perform physical exam, discuss test results, diagnostic possibilities, further testing options, treatment plan options, and care coordination: 64 minutes

## 2024-07-01 NOTE — PATIENT INSTRUCTIONS
It was good to meet you in clinic today. This is what we discussed:    1. Your lung function is normal.  2. Restart the pantoprazole one pill daily for acid reflux.  3. We will have you see a swallowing expert to assess your swallowing mechanism.  4. You can take guaifenesin (Mucinex) 1-2 pills twice daily as needed for throat clearing.  5. We will see what the endoscopy shows.  6. There is a cyst behind the sternum that is benign. We will repeat a chest CT in May 2025 to follow it up.  7. Think about repeating a sleep study.  8. I will see you in 3 months.  9. Contact me with questions or concerns.    Morris Otto MD  Pulmonary and Critical Care Medicine  [facility]  Office [phone]

## 2024-07-01 NOTE — LETTER
7/1/2024      Dilan Head  [address] [place] [id number]      Dear Colleague,    Thank you for referring your patient, Dilan Head, to the [facility]. Please see a copy of my visit note below.    Pulmonary Clinic Outpatient Consultation    Assessment and Plan:  78 year old male former smoker with a history of obesity, GERD, untreated TDO, insomnia, thymic cyst, presenting for evaluation of chronic throat clearing and dyspnea.    Chronic throat clearing and dyspnea: Mostly phlegm in the throat but does note some cough, especially with swallowing. Present for 3 months, possibly longer. Ran out of pantoprazole 2 days ago and symptoms worsened. He walks, no regular aerobic or resistance exercise. Lung function is completely normal. Chest CT in May 2024, showed bibasilar fibroatelectasis, likely due to obesity, possible component of aspiration. Above issues likely worsened by untreated TOD; states that he was given CPAP in the past but did not tolerate it. Has used levalbuterol HFA but has not been beneficial; no clear evidence of asthma. 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 Really does not require follow-up imaging, but can obtain a 12-month CT in May 2025 to be on the safe side.    Plan:  - refilled pantoprazole 40 mg daily.  - referral to SLP for VFSS  - has EGD and colonoscopy scheduled for later this week  - advised reevaluation for his untreated TOD; he does not want a referral at this time  - encouraged regular aerobic and resistance exercise to build cardiovascular and muscle conditioning  - chest CT in May 2025 to follow up thymic cyst  - recommend remaining up to date with respiratory vaccinations  - follow up in 3 months  - encouraged him to contact us with questions or concerning symptoms    Morris Otto MD  [facility]  Office [phone]  Pager [phone]  he/him    CCx: chronic throat  clearing and dyspnea    HPI: 78 year old male former smoker with a history of obesity, GERD, untreated TOD, insomnia, thymic cyst, presenting for evaluation of chronic throat clearing and dyspnea. States that on 24 he developed acute chest pain, cough, and dyspnea. Went to ED, had chest CTA showing no PE, very small left pleural effusion, bibasilar fibroatelectasis, possible LLL pneumonitis. States that he coughs with swallowing. Cough improved with pantoprazole, which he ran out of 2 days ago. Had heart racing with albuterol. Tolerates levalbuterol but has not helped his dyspnea. Has dyspnea with walking, doing yard work. Walks a mile per day, does not do more strenuous exercise. Has whistling snore with sleeping per spouse. Patient states he was diagnosed with TOD but did not tolerate CPAP. No childhood asthma. Has some nasal congestion. Started smoking at age 20, up to <1 ppd, quit in his 20s. No Fhx of lung disease.     ROS:  A 12-system review was obtained and was negative with the exception of the symptoms endorsed in the history of present illness.    PMH:  former smoker with a history of obesity, GERD, untreated TOD, insomnia, thymic cyst    PSH:  Past Surgical History:   Procedure Laterality Date     APPENDECTOMY       ELBOW SURGERY       EXCISE LESION TRUNK N/A 2017    Procedure: EXCISION BACK MASS;  Surgeon: Sammy Riley MD;  Location: [facility];  Service:        Allergies:  No Known Allergies    Family HX:  No family history on file.    Social Hx:  Social History     Socioeconomic History     Marital status:      Spouse name: Not on file     Number of children: Not on file     Years of education: Not on file     Highest education level: Not on file   Occupational History     Not on file   Tobacco Use     Smoking status: Former     Current packs/day: 0.00     Types: Cigarettes     Quit date: 1987     Years since quittin.4     Smokeless tobacco: Never   Vaping Use      Vaping status: Never Used   Substance and Sexual Activity     Alcohol use: Yes     Alcohol/week: 3.0 - 4.0 standard drinks of alcohol     Comment: Alcoholic Drinks/day: per week     Drug use: No     Sexual activity: Not on file   Other Topics Concern     Not on file   Social History Narrative     Not on file     Social Determinants of Health     Financial Resource Strain: Not on file   Food Insecurity: Not on file   Transportation Needs: Not on file   Physical Activity: Not on file   Stress: Not on file   Social Connections: Not on file   Interpersonal Safety: Not on file   Housing Stability: Not on file       Current Meds:  Current Outpatient Medications   Medication Sig Dispense Refill     acetaminophen (TYLENOL) 500 MG tablet Take 2 tablets (1,000 mg) by mouth 3 times daily (Patient taking differently: Take 1,000 mg by mouth every 8 hours as needed)       acyclovir (ZOVIRAX) 400 MG tablet [ACYCLOVIR (ZOVIRAX) 400 MG TABLET] Take 400 mg by mouth 3 (three) times a day as needed.       albuterol (PROAIR HFA/PROVENTIL HFA/VENTOLIN HFA) 108 (90 Base) MCG/ACT inhaler Inhale 1-2 puffs into the lungs 4 times daily as needed for shortness of breath or wheezing       fluticasone (FLONASE) 50 MCG/ACT nasal spray Spray 1 spray into both nostrils 2 times daily       guaiFENesin (MUCINEX) 600 MG 12 hr tablet Take 1-2 tablets (600-1,200 mg) by mouth 2 times daily as needed for congestion 120 tablet 11     hydrocortisone (CORTAID) 1 % external cream Apply 1 Application topically as needed (1 application Externally Once a day for two weeks on face due to flare)       multivitamin therapeutic (THERAGRAN) tablet [MULTIVITAMIN THERAPEUTIC (THERAGRAN) TABLET] Take 1 tablet by mouth daily.       oxyCODONE (ROXICODONE) 5 MG tablet Take 1 tablet (5 mg) by mouth every 4 hours as needed for breakthrough pain 40 tablet 0     pantoprazole (PROTONIX) 40 MG EC tablet Take 1 tablet (40 mg) by mouth every morning (before breakfast) 30  "tablet 11     traZODone (DESYREL) 50 MG tablet Take 0.5-1 tablets (25-50 mg) by mouth nightly as needed for sleep 30 tablet 0     ketoconazole (NIZORAL) 2 % shampoo [KETOCONAZOLE (NIZORAL) 2 % SHAMPOO] Apply 1 application topically as needed for itching. Apply to damp skin, lather, leave on 5 minutes, and rinse (Patient not taking: Reported on 7/1/2024)         Physical Exam:  /76 (BP Location: Left arm, Patient Position: Chair, Cuff Size: Adult Regular)   Pulse 80   Ht 1.803 m (5' 11\")   SpO2 98%   BMI 34.87 kg/m    Gen: alert, oriented, no distress  HEENT: nasal mucosa is mildly edematous, no oropharyngeal lesions, no cervical or supraclavicular lymphadenopathy  CV: RRR, no M/G/R  Resp: CTAB, no focal crackles or wheezes  Skin: no apparent rashes  Ext: no cyanosis, clubbing or edema  Neuro: alert, nonfocal    Labs:  reviewed    Imaging:  Chest CTA (May 2024):  - images directly reviewed, formal interpretation follows:  FINDINGS:   ANGIOGRAM CHEST: Mild motion. No pulmonary embolus. Nonaneurysmal aorta without dissection.     LUNGS AND PLEURA: Motion artifact. New small left pleural effusion. Moderate left and mild right lower lobe predominant volume loss and atelectasis. No pneumothorax.     MEDIASTINUM/AXILLAE: No adenopathy. Stable anterior mediastinal hypoattenuating lesion (series 4, image 68). New small pericardial effusion.     CORONARY ARTERY CALCIFICATION: Motion artifact.     UPPER ABDOMEN: Small hiatus hernia.     MUSCULOSKELETAL: Normal.    1.  No pulmonary artery embolism identified.     2.  Nonaneurysmal aorta without dissection.     3.  New small left pleural effusion, nonspecific in etiology. New small pericardial effusion.     4.  Moderate left and mild right lower lobe predominant volume loss and atelectasis. Remaining lungs are grossly clear.     5.  Stable anterior mediastinal hypoattenuating lesion. Thymic lesion is suspected. MRI with contrast recommended to evaluate solid versus " cystic components. Surgical consultation could also be performed.    TTE (April 2024):  1. Technically difficult study.  2. Normal left ventricular size and systolic performance with a visually  estimated ejection fraction of 55-60%.  3. No significant valvular heart disease is identified on this study.  4. Probable normal right ventricular size and systolic performance though  right-sided structures are not clearly visualized on all views on this study.    Pulmonary Function Testing  July 2024  Normal complete PFT    Time spent on chart and image review, meeting with the patient to obtain history, perform physical exam, discuss test results, diagnostic possibilities, further testing options, treatment plan options, and care coordination: 64 minutes      Again, thank you for allowing me to participate in the care of your patient.        Sincerely,        Morris Otto MD

## 2024-07-15 ENCOUNTER — HOSPITAL ENCOUNTER (OUTPATIENT)
Dept: RADIOLOGY | Facility: CLINIC | Age: 79
Discharge: HOME | End: 2024-07-15
Attending: INTERNAL MEDICINE
Payer: COMMERCIAL

## 2024-07-15 ENCOUNTER — HOSPITAL ENCOUNTER (OUTPATIENT)
Dept: SPEECH THERAPY | Facility: CLINIC | Age: 79
Discharge: HOME | End: 2024-07-15
Attending: INTERNAL MEDICINE
Payer: COMMERCIAL

## 2024-07-15 DIAGNOSIS — R13.10 DYSPHAGIA, UNSPECIFIED TYPE: ICD-10-CM

## 2024-07-15 PROCEDURE — 74230 X-RAY XM SWLNG FUNCJ C+: CPT

## 2024-07-15 PROCEDURE — 92610 EVALUATE SWALLOWING FUNCTION: CPT | Mod: GN,59

## 2024-07-15 PROCEDURE — 92611 MOTION FLUOROSCOPY/SWALLOW: CPT | Mod: GN

## 2024-07-15 RX ORDER — BARIUM SULFATE 400 MG/ML
SUSPENSION ORAL ONCE
Status: COMPLETED | OUTPATIENT
Start: 2024-07-15 | End: 2024-07-15

## 2024-07-15 RX ADMIN — BARIUM SULFATE 40 ML: 400 SUSPENSION ORAL at 09:55

## 2024-07-15 NOTE — PROGRESS NOTES
"SPEECH LANGUAGE PATHOLOGY EVALUATION    See electronic medical record for Abuse and Falls Screening details.    Subjective      Presenting condition or subjective complaint: Chronic throat clearing  Date of onset: 07/01/24 (Order date)    Relevant medical history: GERD, untreated TOD  Dates & types of surgery: Refer to EMR    Prior diagnostic imaging/testing results:  None specific to swallow. Chest MRI on 5/7/24 showed small left and trace right pleural effusions and mild basilar atelectasis.      Prior therapy history for the same diagnosis, illness or injury:  No      General observations: Patient pleasant and cooperative. He arrived to this appointment unaccompanied.    Pain assessment: Pain denied     Objective     SWALLOW EVALUTION  Dysphagia history: Patient was referred by Pulmonary/Critical Care physician Dr. Morris Otto. He saw patient at the Pulmonary Clinic on 7/1/24. His office visit note states the following: \"78 year old male former smoker with a history of obesity, GERD, untreated TOD, insomnia, thymic cyst, presenting for evaluation of chronic throat clearing and dyspnea. States that on 4/17/24 he developed acute chest pain, cough, and dyspnea. Went to ED, had chest CTA showing no PE, very small left pleural effusion, bibasilar fibroatelectasis, possible LLL pneumonitis. States that he coughs with swallowing. Cough improved with pantoprazole, which he ran out of 2 days ago. Had heart racing with albuterol. Tolerates levalbuterol but has not helped his dyspnea. Has dyspnea with walking, doing yard work. Walks a mile per day, does not do more strenuous exercise. Has whistling snore with sleeping per spouse. Patient states he was diagnosed with TOD but did not tolerate CPAP. No childhood asthma. Has some nasal congestion. Started smoking at age 20, up to <1 ppd, quit in his 20s. No Fhx of lung disease.\" Interestingly, during this visit, patient denies coughing or choking with PO intake. When informed " "that Dr. Otto's note mentions coughing with swallowing, patient states, \"That must have been my wife who said that.\" His primary concern today is that he wakes up with phlegm in the back of his throat. He also notes increased phlegm production after meals sometimes. Patient reports that he had an EGD earlier this month, but does not recall the results of this. Writer was unable to locate EGD report in patient's EMR.  Current Diet/Method of Nutritional Intake:  Regular food textures and thin liquids        CLINICAL SWALLOW EVALUATION  Clinical swallow evaluation completed prior to videofluoroscopic swallow study (VFSS) via review of previous records, clinical interview, and oral motor examination.    Oral Motor Function: generally intact     ADDITIONAL EVAL COMPLETED TODAY : yes; rationale: videofluoroscopic swallow study (VFSS) completed per provider order .    VIDEOFLUOROSCOPIC SWALLOW STUDY  Radiologist: Sorin Beckett DO  Views Taken: Left lateral  Physical location of procedure: [facility]  Patient was sitting upright in swallow study chair.    **Unfortunately, none of the images from this study were saved/recorded**    VFSS textures trialed:   VFSS Eval: Thin Liquids  Mode of Presentation: cup, self-fed   Order of Presentation: Trials 1, 2, 6.  Trial 6 consisted of large, consecutive sips taken by cup.  Preparatory Phase: WFL  Oral Phase: WFL  Bolus Location When Swallow Initiated: posterior laryngeal surface of epiglottis  Pharyngeal Phase: impaired epiglottic movement (posterior-inferior movement)  Rosenbeck's Penetration Aspiration Scale: 2 - contrast enters airway, remains above the vocal cords, no residue remains (penetration)  Response to Aspiration:  N/A  Strategies and Compensations:  N/A  Diagnostic Statement: Consistent laryngeal penetration occurred with trials of thin liquid. This was shallow and transient in nature, and does not appear to significantly increase " patient's aspiration risk.    VFSS Eval: Purees  Mode of Presentation: spoon, self-fed   Order of Presentation: Trials 3, 4, 5  Preparatory Phase: WFL  Oral Phase: WFL  Bolus Location When Swallow Initiated: posterior angle of ramus, valleculae  Pharyngeal Phase: residue in valleculae (mild)  Rosenbeck s Penetration Aspiration Scale: 1 - no aspiration, contrast does not enter airway  Response to Aspiration:  N/A  Strategies and Compensations:  N/A  Diagnostic Statement: No aspiration or laryngeal penetration observed. Mild vallecular stasis was noted after each bolus. This did not accumulate with subsequent boluses.     ESOPHAGEAL PHASE OF SWALLOW  Patient's symptoms of chronic throat clearing and increased phlegm (particularly in the morning) are suggestive of poorly controlled acid reflux. Notably, patient states that he does not take his PPI (pantoprazole) daily as directed.       SWALLOW ASSESSMENT CLINICAL IMPRESSIONS AND RATIONALE  Diet Consistency Recommendations: Regular food textures and thin liquids   Recommended Safe or Compensatory Swallowing Strategies: small bolus size, slow rate of intake, alternate food and liquid intake, maintain upright posture during/after eating for 30 minutes to minimize risk of reflux. Patient to also take his PPI daily and as directed.  Medication Administration Recommendations: As tolerated  Instrumental Assessment Recommendations: Further instrumental evaluation is not indicated at this time.     Assessment & Plan   CLINICAL IMPRESSIONS   Medical Diagnosis: Dysphagia, unspecified type    Treatment Diagnosis:  Mild pharyngeal dysphagia   Impression/Assessment: Videofloroscopic swallow study completed per provider order. Notably, video images were not saved/recorded, so this writer was unable to review study prior to documenting results. Patient presents with mild pharyngeal dysphagia. Aspiration did not occur during this evaluation. Oral phase was characterized by good bolus  control with both consistencies. Bolus prep and A-P bolus transit were effective and timely. Tongue base retraction was WNL. Pharyngeal phase was characterized by a timely swallow response with puree. Swallow response was mildly delayed with thin liquid, triggering along the posterior laryngeal surface of the epiglottis. The epiglottis inverted with trials of puree, but not with trials of thin liquid. With this consistency, the epiglottis moved in a posterior-inferior pattern. This resulted in reduced airway closure and consistent shallow, transient laryngeal penetration with thin liquid. Hyolaryngeal excursion and hyolaryngeal elevation appeared adequate. Pharyngeal constriction was WNL. Barium contrast readily passed through the PES and cervical esophagus.      Patient's overall aspiration risk is low. He is appropriate to continue his current diet of regular food textures and thin liquids with use of the standard safe swallowing precautions listed above.    PLAN OF CARE  Recommended Referrals to Other Professionals:  Continue to follow with GI as needed, as symptoms may be related to poorly controlled acid reflux. If symptoms don't resolve with GERD management, could consider ENT consult.  Education Assessment:   Learner/Method: Patient;Pictures/Video;Listening;Reading    Risks and benefits of evaluation/treatment have been explained.   Patient/Family/caregiver agrees with Plan of Care.     Evaluation Time:    SLP Eval: oral/pharyngeal swallow function, clinical minutes (00338): 10  SLP Eval: VideoFluoroscopic Swallow function Minutes (57053): 15      Signing Clinician: LUCY Narayanan

## 2024-07-18 ENCOUNTER — TRANSFERRED RECORDS (OUTPATIENT)
Dept: HEALTH INFORMATION MANAGEMENT | Facility: CLINIC | Age: 79
End: 2024-07-18
Payer: COMMERCIAL

## 2024-07-23 ENCOUNTER — HOSPITAL ENCOUNTER (OUTPATIENT)
Facility: HOSPITAL | Age: 79
End: 2024-07-23
Attending: SURGERY | Admitting: SURGERY
Payer: COMMERCIAL

## 2024-11-29 ENCOUNTER — HOSPITAL ENCOUNTER (OUTPATIENT)
Dept: MRI IMAGING | Facility: CLINIC | Age: 79
Discharge: HOME | End: 2024-11-29
Attending: UROLOGY | Admitting: UROLOGY
Payer: COMMERCIAL

## 2024-11-29 DIAGNOSIS — D15.0 BENIGN NEOPLASM OF THYMUS: ICD-10-CM

## 2024-11-29 PROCEDURE — A9585 GADOBUTROL INJECTION: HCPCS | Performed by: UROLOGY

## 2024-11-29 PROCEDURE — 255N000002 HC RX 255 OP 636: Performed by: UROLOGY

## 2024-11-29 PROCEDURE — 71552 MRI CHEST W/O & W/DYE: CPT

## 2024-11-29 RX ORDER — GADOBUTROL 604.72 MG/ML
10 INJECTION INTRAVENOUS ONCE
Status: COMPLETED | OUTPATIENT
Start: 2024-11-29 | End: 2024-11-29

## 2024-11-29 RX ADMIN — GADOBUTROL 10 ML: 604.72 INJECTION INTRAVENOUS at 09:06

## 2025-05-03 ENCOUNTER — LAB REQUISITION (OUTPATIENT)
Dept: LAB | Facility: CLINIC | Age: 80
End: 2025-05-03

## 2025-05-03 DIAGNOSIS — R06.09 OTHER FORMS OF DYSPNEA: ICD-10-CM

## 2025-05-03 LAB — NT-PROBNP SERPL-MCNC: 67 PG/ML (ref 0–1800)

## 2025-05-03 PROCEDURE — 83880 ASSAY OF NATRIURETIC PEPTIDE: CPT | Performed by: FAMILY MEDICINE
